# Patient Record
Sex: MALE | Race: WHITE | NOT HISPANIC OR LATINO | Employment: OTHER | URBAN - METROPOLITAN AREA
[De-identification: names, ages, dates, MRNs, and addresses within clinical notes are randomized per-mention and may not be internally consistent; named-entity substitution may affect disease eponyms.]

---

## 2023-03-07 DIAGNOSIS — M54.9 OTHER CHRONIC BACK PAIN: ICD-10-CM

## 2023-03-07 DIAGNOSIS — G89.29 OTHER CHRONIC BACK PAIN: ICD-10-CM

## 2023-03-08 RX ORDER — HYDROCODONE BITARTRATE AND ACETAMINOPHEN 5; 325 MG/1; MG/1
1 TABLET ORAL EVERY 6 HOURS PRN
Qty: 90 TABLET | Status: CANCELLED | OUTPATIENT
Start: 2023-03-08

## 2023-03-08 RX ORDER — ASPIRIN 325 MG
325 TABLET ORAL DAILY
COMMUNITY
Start: 2007-08-27 | End: 2023-08-10 | Stop reason: ALTCHOICE

## 2023-03-08 RX ORDER — NALOXONE HYDROCHLORIDE 4 MG/.1ML
SPRAY NASAL
COMMUNITY
Start: 2021-11-04

## 2023-03-08 RX ORDER — LEVOTHYROXINE SODIUM 112 UG/1
1 TABLET ORAL DAILY
COMMUNITY
End: 2023-03-15 | Stop reason: SDUPTHER

## 2023-03-08 RX ORDER — HYDROCODONE BITARTRATE AND ACETAMINOPHEN 5; 325 MG/1; MG/1
1 TABLET ORAL EVERY 6 HOURS PRN
COMMUNITY
Start: 2022-05-10 | End: 2023-08-10 | Stop reason: ALTCHOICE

## 2023-03-08 RX ORDER — TAMSULOSIN HYDROCHLORIDE 0.4 MG/1
0.4 CAPSULE ORAL DAILY
COMMUNITY

## 2023-03-08 RX ORDER — OXYCODONE AND ACETAMINOPHEN 5; 325 MG/1; MG/1
1 TABLET ORAL DAILY
COMMUNITY
End: 2023-03-08 | Stop reason: SDUPTHER

## 2023-03-08 RX ORDER — VARDENAFIL HYDROCHLORIDE 20 MG/1
20 TABLET ORAL
COMMUNITY
Start: 2006-12-11

## 2023-03-08 RX ORDER — OXYCODONE AND ACETAMINOPHEN 5; 325 MG/1; MG/1
1 TABLET ORAL DAILY
Qty: 30 TABLET | Refills: 0 | Status: SHIPPED | OUTPATIENT
Start: 2023-03-08 | End: 2023-05-12 | Stop reason: SDUPTHER

## 2023-03-08 RX ORDER — PRAVASTATIN SODIUM 40 MG/1
40 TABLET ORAL NIGHTLY
COMMUNITY
Start: 2009-02-03 | End: 2023-08-22 | Stop reason: SDUPTHER

## 2023-03-08 NOTE — TELEPHONE ENCOUNTER
Rx Refill Request Telephone Encounter    Name:  Dale Mcmahan  :  [id number]  Medication Name:  oxycodone  5-325    1 daily      Specific Pharmacy location:  [facility]  Date of last appointment:  2023

## 2023-03-15 ENCOUNTER — TELEPHONE (OUTPATIENT)
Dept: PRIMARY CARE | Facility: CLINIC | Age: 88
End: 2023-03-15
Payer: MEDICARE

## 2023-03-15 DIAGNOSIS — E03.9 HYPOTHYROIDISM, UNSPECIFIED TYPE: ICD-10-CM

## 2023-03-15 RX ORDER — LEVOTHYROXINE SODIUM 112 UG/1
112 TABLET ORAL DAILY
Qty: 90 TABLET | Refills: 3 | Status: SHIPPED | OUTPATIENT
Start: 2023-03-15

## 2023-05-08 PROBLEM — M54.9 CHRONIC BACK PAIN GREATER THAN 3 MONTHS DURATION: Status: ACTIVE | Noted: 2023-05-08

## 2023-05-08 PROBLEM — R91.8 LUNG NODULES: Status: ACTIVE | Noted: 2023-05-08

## 2023-05-08 PROBLEM — R91.8 ABNORMAL CHEST X-RAY WITH MULTIPLE LUNG NODULES: Status: ACTIVE | Noted: 2023-05-08

## 2023-05-08 PROBLEM — E78.5 DYSLIPIDEMIA, GOAL LDL BELOW 100: Status: ACTIVE | Noted: 2023-05-08

## 2023-05-08 PROBLEM — R97.20 ELEVATED PSA: Status: ACTIVE | Noted: 2023-05-08

## 2023-05-08 PROBLEM — R33.8 URINARY RETENTION DUE TO BENIGN PROSTATIC HYPERPLASIA: Status: ACTIVE | Noted: 2023-05-08

## 2023-05-08 PROBLEM — G89.29 CHRONIC BACK PAIN GREATER THAN 3 MONTHS DURATION: Status: ACTIVE | Noted: 2023-05-08

## 2023-05-08 PROBLEM — N40.0 BPH (BENIGN PROSTATIC HYPERPLASIA): Status: ACTIVE | Noted: 2023-05-08

## 2023-05-08 PROBLEM — H25.012 CORTICAL AGE-RELATED CATARACT OF LEFT EYE: Status: ACTIVE | Noted: 2023-05-08

## 2023-05-08 PROBLEM — E03.9 ACQUIRED HYPOTHYROIDISM: Status: ACTIVE | Noted: 2023-05-08

## 2023-05-08 PROBLEM — N40.1 URINARY RETENTION DUE TO BENIGN PROSTATIC HYPERPLASIA: Status: ACTIVE | Noted: 2023-05-08

## 2023-05-10 ENCOUNTER — OFFICE VISIT (OUTPATIENT)
Dept: PRIMARY CARE | Facility: CLINIC | Age: 88
End: 2023-05-10
Payer: MEDICARE

## 2023-05-10 VITALS
WEIGHT: 157.8 LBS | SYSTOLIC BLOOD PRESSURE: 124 MMHG | DIASTOLIC BLOOD PRESSURE: 74 MMHG | HEIGHT: 70 IN | HEART RATE: 76 BPM | BODY MASS INDEX: 22.59 KG/M2 | OXYGEN SATURATION: 95 %

## 2023-05-10 DIAGNOSIS — R73.02 IGT (IMPAIRED GLUCOSE TOLERANCE): ICD-10-CM

## 2023-05-10 DIAGNOSIS — E78.5 DYSLIPIDEMIA, GOAL LDL BELOW 100: ICD-10-CM

## 2023-05-10 DIAGNOSIS — G89.29 CHRONIC BACK PAIN GREATER THAN 3 MONTHS DURATION: ICD-10-CM

## 2023-05-10 DIAGNOSIS — E03.9 ACQUIRED HYPOTHYROIDISM: ICD-10-CM

## 2023-05-10 DIAGNOSIS — Z00.00 ROUTINE GENERAL MEDICAL EXAMINATION AT HEALTH CARE FACILITY: ICD-10-CM

## 2023-05-10 DIAGNOSIS — M54.9 CHRONIC BACK PAIN GREATER THAN 3 MONTHS DURATION: ICD-10-CM

## 2023-05-10 DIAGNOSIS — K21.9 GASTROESOPHAGEAL REFLUX DISEASE WITHOUT ESOPHAGITIS: ICD-10-CM

## 2023-05-10 DIAGNOSIS — Z00.00 MEDICARE ANNUAL WELLNESS VISIT, SUBSEQUENT: Primary | ICD-10-CM

## 2023-05-10 DIAGNOSIS — R97.20 ELEVATED PSA: ICD-10-CM

## 2023-05-10 DIAGNOSIS — N40.1 URINARY RETENTION DUE TO BENIGN PROSTATIC HYPERPLASIA: ICD-10-CM

## 2023-05-10 DIAGNOSIS — R33.8 URINARY RETENTION DUE TO BENIGN PROSTATIC HYPERPLASIA: ICD-10-CM

## 2023-05-10 PROBLEM — N40.0 BPH (BENIGN PROSTATIC HYPERPLASIA): Status: RESOLVED | Noted: 2023-05-08 | Resolved: 2023-05-10

## 2023-05-10 PROCEDURE — 1160F RVW MEDS BY RX/DR IN RCRD: CPT | Performed by: INTERNAL MEDICINE

## 2023-05-10 PROCEDURE — 99214 OFFICE O/P EST MOD 30 MIN: CPT | Performed by: INTERNAL MEDICINE

## 2023-05-10 PROCEDURE — 99397 PER PM REEVAL EST PAT 65+ YR: CPT | Performed by: INTERNAL MEDICINE

## 2023-05-10 PROCEDURE — G0444 DEPRESSION SCREEN ANNUAL: HCPCS | Performed by: INTERNAL MEDICINE

## 2023-05-10 PROCEDURE — G0442 ANNUAL ALCOHOL SCREEN 15 MIN: HCPCS | Performed by: INTERNAL MEDICINE

## 2023-05-10 PROCEDURE — 99497 ADVNCD CARE PLAN 30 MIN: CPT | Performed by: INTERNAL MEDICINE

## 2023-05-10 PROCEDURE — 1159F MED LIST DOCD IN RCRD: CPT | Performed by: INTERNAL MEDICINE

## 2023-05-10 PROCEDURE — G0439 PPPS, SUBSEQ VISIT: HCPCS | Performed by: INTERNAL MEDICINE

## 2023-05-10 PROCEDURE — 1036F TOBACCO NON-USER: CPT | Performed by: INTERNAL MEDICINE

## 2023-05-10 PROCEDURE — 1170F FXNL STATUS ASSESSED: CPT | Performed by: INTERNAL MEDICINE

## 2023-05-10 ASSESSMENT — ACTIVITIES OF DAILY LIVING (ADL)
DRESSING: INDEPENDENT
TAKING_MEDICATION: INDEPENDENT
MANAGING_FINANCES: INDEPENDENT
DOING_HOUSEWORK: INDEPENDENT
BATHING: INDEPENDENT
GROCERY_SHOPPING: INDEPENDENT

## 2023-05-10 ASSESSMENT — PATIENT HEALTH QUESTIONNAIRE - PHQ9
1. LITTLE INTEREST OR PLEASURE IN DOING THINGS: NOT AT ALL
SUM OF ALL RESPONSES TO PHQ9 QUESTIONS 1 AND 2: 0
2. FEELING DOWN, DEPRESSED OR HOPELESS: NOT AT ALL

## 2023-05-10 ASSESSMENT — ENCOUNTER SYMPTOMS
LOSS OF SENSATION IN FEET: 0
OCCASIONAL FEELINGS OF UNSTEADINESS: 0
DEPRESSION: 0

## 2023-05-10 ASSESSMENT — ANXIETY QUESTIONNAIRES
IF YOU CHECKED OFF ANY PROBLEMS ON THIS QUESTIONNAIRE, HOW DIFFICULT HAVE THESE PROBLEMS MADE IT FOR YOU TO DO YOUR WORK, TAKE CARE OF THINGS AT HOME, OR GET ALONG WITH OTHER PEOPLE: NOT DIFFICULT AT ALL
3. WORRYING TOO MUCH ABOUT DIFFERENT THINGS: NOT AT ALL
1. FEELING NERVOUS, ANXIOUS, OR ON EDGE: NOT AT ALL
2. NOT BEING ABLE TO STOP OR CONTROL WORRYING: NOT AT ALL
6. BECOMING EASILY ANNOYED OR IRRITABLE: NOT AT ALL
4. TROUBLE RELAXING: NOT AT ALL
GAD7 TOTAL SCORE: 0
7. FEELING AFRAID AS IF SOMETHING AWFUL MIGHT HAPPEN: NOT AT ALL
5. BEING SO RESTLESS THAT IT IS HARD TO SIT STILL: NOT AT ALL

## 2023-05-10 NOTE — PROGRESS NOTES
Alcohol consumption becomes hazardous when consuming women oh over 65 years old greater than 7 drinks per week or greater than 3 drinks per occasion for men greater than 14 drinks per week or greater than 4 drinks per occasion.  I spent 15 minutes screening for alcohol use.Depression and anxiety screening completed   PHQ9 score   GAD7 score   I spent 15 minutes obtaining and discussing depression screening using PHQ 2 questions with results documented in chart.  Screening using PHQ-9 and YAYO-7 scores were used for follow-up with treatment and referral plan discussed.      I spent greater than 15 minutes discussing advance care planning including the explanation and discussion of advanced directives.  If patient does not have current up-to-date documents examples and information provided on how to create both living will and power of .  toolkit was given to patient and was encouraged to work out completing these documents.OARRS:  Jackson Zhang DO on 5/10/2023 12:07 PM  I have personally reviewed the OARRS report for Dale Barrettp. I have considered the risks of abuse, dependence, addiction and diversion    Is the patient prescribed a combination of a benzodiazepine and opioid?  No    Last Urine Drug Screen / ordered today: Yes  No results found for this or any previous visit (from the past 97723 hour(s)).  Results are as expected.     Controlled Substance Agreement:  Date of the Last Agreement: 5/10/23  Reviewed Controlled Substance Agreement including but not limited to the benefits, risks, and alternatives to treatment with a Controlled Substance medication(s).    Opioids:  What is the patient's goal of therapy? y  Is this being achieved with current treatment? y    I have calculated the patient's Morphine Dose Equivalent (MED):   I have considered referral to Pain Management and/or a specialist, and do not feel it is necessary at this time.    I feel that it is clinically indicated to continue  "this current medication regimen after consideration of alternative therapies, and other non-opioid treatment.    Opioid Risk Screening:  No data recorded    Pain Assessment:  No data recordedSubjective   Reason for Visit: Dale Mcmahan is an 87 y.o. male here for a Medicare Wellness visit.     Past Medical, Surgical, and Family History reviewed and updated in chart.    Reviewed all medications by prescribing practitioner or clinical pharmacist (such as prescriptions, OTCs, herbal therapies and supplements) and documented in the medical record.    HPI    Patient Care Team:  Jackson Zhang DO as PCP - General  Jackson Zhang DO as PCP - [employer] PCP     Review of Systems   All other systems reviewed and are negative.      Objective   Vitals:  /74   Pulse 76   Ht 1.778 m (5' 10\")   Wt 71.6 kg (157 lb 12.8 oz)   SpO2 95%   BMI 22.64 kg/m²       Physical Exam  Vitals and nursing note reviewed.   Constitutional:       General: He is not in acute distress.     Appearance: Normal appearance. He is well-developed. He is not toxic-appearing.   HENT:      Head: Normocephalic and atraumatic.      Right Ear: Tympanic membrane and external ear normal.      Left Ear: Tympanic membrane and external ear normal.      Nose: Nose normal.      Mouth/Throat:      Mouth: Mucous membranes are moist.      Pharynx: Oropharynx is clear. No oropharyngeal exudate or posterior oropharyngeal erythema.      Tonsils: No tonsillar exudate. 2+ on the right. 2+ on the left.   Eyes:      Extraocular Movements: Extraocular movements intact.      Conjunctiva/sclera: Conjunctivae normal.   Cardiovascular:      Rate and Rhythm: Normal rate and regular rhythm.      Pulses: Normal pulses.      Heart sounds: Normal heart sounds. No murmur heard.  Pulmonary:      Effort: Pulmonary effort is normal.      Breath sounds: Normal breath sounds.   Abdominal:      General: Abdomen is flat. Bowel sounds are normal.      " Palpations: Abdomen is soft.   Musculoskeletal:      Cervical back: Neck supple.   Feet:      Right foot:      Skin integrity: Skin integrity normal. No ulcer, blister, skin breakdown, erythema, warmth or callus.      Toenail Condition: Right toenails are normal.      Left foot:      Skin integrity: Skin integrity normal. No ulcer, blister, skin breakdown, erythema, warmth or callus.      Toenail Condition: Left toenails are normal.   Lymphadenopathy:      Cervical: No cervical adenopathy.   Skin:     General: Skin is warm and dry.      Capillary Refill: Capillary refill takes more than 3 seconds.      Findings: No rash.   Neurological:      Mental Status: He is alert. Mental status is at baseline.      Sensory: Sensation is intact.   Psychiatric:         Mood and Affect: Mood normal.         Behavior: Behavior normal.         Thought Content: Thought content normal.         Judgment: Judgment normal.         Assessment/Plan   Problem List Items Addressed This Visit          Digestive    Gastroesophageal reflux disease       Genitourinary    Elevated PSA    Urinary retention due to benign prostatic hyperplasia       Endocrine/Metabolic    Acquired hypothyroidism    Relevant Orders    Comprehensive Metabolic Panel    Hemoglobin A1C    Lipid Panel    Albumin , Urine Random    Tsh With Reflex To Free T4 If Abnormal       Other    Chronic back pain greater than 3 months duration    Dyslipidemia, goal LDL below 100    Relevant Orders    Comprehensive Metabolic Panel    Hemoglobin A1C    Lipid Panel    Albumin , Urine Random    Tsh With Reflex To Free T4 If Abnormal    Medicare annual wellness visit, subsequent - Primary    Relevant Medications    zoster vaccine-recombinant adjuvanted (Shingrix) 50 mcg/0.5 mL vaccine    Other Relevant Orders    Follow Up In [facility] - PCP     Other Visit Diagnoses       IGT (impaired glucose tolerance)        Relevant Orders    Hemoglobin A1C    Routine general medical  examination at health care facility

## 2023-05-12 DIAGNOSIS — G89.29 OTHER CHRONIC BACK PAIN: ICD-10-CM

## 2023-05-12 DIAGNOSIS — M54.9 OTHER CHRONIC BACK PAIN: ICD-10-CM

## 2023-05-12 RX ORDER — OXYCODONE AND ACETAMINOPHEN 5; 325 MG/1; MG/1
1 TABLET ORAL DAILY
Qty: 30 TABLET | Refills: 0 | Status: SHIPPED | OUTPATIENT
Start: 2023-05-12 | End: 2023-06-12 | Stop reason: SDUPTHER

## 2023-06-12 ENCOUNTER — TELEPHONE (OUTPATIENT)
Dept: PRIMARY CARE | Facility: CLINIC | Age: 88
End: 2023-06-12
Payer: MEDICARE

## 2023-06-12 DIAGNOSIS — G89.29 OTHER CHRONIC BACK PAIN: ICD-10-CM

## 2023-06-12 DIAGNOSIS — M54.9 OTHER CHRONIC BACK PAIN: ICD-10-CM

## 2023-06-12 RX ORDER — OXYCODONE AND ACETAMINOPHEN 5; 325 MG/1; MG/1
1 TABLET ORAL DAILY
Qty: 30 TABLET | Refills: 0 | Status: SHIPPED | OUTPATIENT
Start: 2023-06-12 | End: 2023-07-13 | Stop reason: SDUPTHER

## 2023-06-12 NOTE — TELEPHONE ENCOUNTER
Rx Refill Request Telephone Encounter    Name:  Dale Mcmahan  :  [id number]  Medication Name:  Percocet  5-325        Take 1 tablet by mouth once daily  Specific Pharmacy location:  [facility]  Date of last appointment:  5/10/2023  Date of next appointment:  8/10/2023

## 2023-07-13 DIAGNOSIS — M54.9 OTHER CHRONIC BACK PAIN: ICD-10-CM

## 2023-07-13 DIAGNOSIS — G89.29 OTHER CHRONIC BACK PAIN: ICD-10-CM

## 2023-07-13 RX ORDER — OXYCODONE AND ACETAMINOPHEN 5; 325 MG/1; MG/1
1 TABLET ORAL DAILY
Qty: 30 TABLET | Refills: 0 | Status: SHIPPED | OUTPATIENT
Start: 2023-07-13 | End: 2023-08-16 | Stop reason: SDUPTHER

## 2023-08-08 ENCOUNTER — LAB (OUTPATIENT)
Dept: LAB | Facility: LAB | Age: 88
End: 2023-08-08
Payer: MEDICARE

## 2023-08-08 DIAGNOSIS — E03.9 ACQUIRED HYPOTHYROIDISM: ICD-10-CM

## 2023-08-08 DIAGNOSIS — R73.02 IGT (IMPAIRED GLUCOSE TOLERANCE): ICD-10-CM

## 2023-08-08 DIAGNOSIS — E78.5 DYSLIPIDEMIA, GOAL LDL BELOW 100: ICD-10-CM

## 2023-08-08 LAB
ALANINE AMINOTRANSFERASE (SGPT) (U/L) IN SER/PLAS: 16 U/L (ref 10–52)
ALBUMIN (G/DL) IN SER/PLAS: 4.1 G/DL (ref 3.4–5)
ALBUMIN (MG/L) IN URINE: 7.5 MG/L
ALBUMIN/CREATININE (UG/MG) IN URINE: 13.3 UG/MG CRT (ref 0–30)
ALKALINE PHOSPHATASE (U/L) IN SER/PLAS: 79 U/L (ref 33–136)
ANION GAP IN SER/PLAS: 13 MMOL/L (ref 10–20)
ASPARTATE AMINOTRANSFERASE (SGOT) (U/L) IN SER/PLAS: 18 U/L (ref 9–39)
BILIRUBIN TOTAL (MG/DL) IN SER/PLAS: 0.5 MG/DL (ref 0–1.2)
CALCIUM (MG/DL) IN SER/PLAS: 9.1 MG/DL (ref 8.6–10.3)
CARBON DIOXIDE, TOTAL (MMOL/L) IN SER/PLAS: 29 MMOL/L (ref 21–32)
CHLORIDE (MMOL/L) IN SER/PLAS: 102 MMOL/L (ref 98–107)
CHOLESTEROL (MG/DL) IN SER/PLAS: 154 MG/DL (ref 0–199)
CHOLESTEROL IN HDL (MG/DL) IN SER/PLAS: 31.8 MG/DL
CHOLESTEROL/HDL RATIO: 4.8
CREATININE (MG/DL) IN SER/PLAS: 1.1 MG/DL (ref 0.5–1.3)
CREATININE (MG/DL) IN URINE: 56.4 MG/DL (ref 20–370)
ESTIMATED AVERAGE GLUCOSE FOR HBA1C: 148 MG/DL
GFR MALE: 65 ML/MIN/1.73M2
GLUCOSE (MG/DL) IN SER/PLAS: 126 MG/DL (ref 74–99)
HEMOGLOBIN A1C/HEMOGLOBIN TOTAL IN BLOOD: 6.8 %
LDL: 62 MG/DL (ref 0–99)
NON HDL CHOLESTEROL: 122 MG/DL
POTASSIUM (MMOL/L) IN SER/PLAS: 4.1 MMOL/L (ref 3.5–5.3)
PROTEIN TOTAL: 7.6 G/DL (ref 6.4–8.2)
SODIUM (MMOL/L) IN SER/PLAS: 140 MMOL/L (ref 136–145)
THYROTROPIN (MIU/L) IN SER/PLAS BY DETECTION LIMIT <= 0.05 MIU/L: 2.18 MIU/L (ref 0.44–3.98)
TRIGLYCERIDE (MG/DL) IN SER/PLAS: 299 MG/DL (ref 0–149)
UREA NITROGEN (MG/DL) IN SER/PLAS: 10 MG/DL (ref 6–23)
VLDL: 60 MG/DL (ref 0–40)

## 2023-08-08 PROCEDURE — 84443 ASSAY THYROID STIM HORMONE: CPT

## 2023-08-08 PROCEDURE — 80053 COMPREHEN METABOLIC PANEL: CPT

## 2023-08-08 PROCEDURE — 82570 ASSAY OF URINE CREATININE: CPT

## 2023-08-08 PROCEDURE — 36415 COLL VENOUS BLD VENIPUNCTURE: CPT

## 2023-08-08 PROCEDURE — 80061 LIPID PANEL: CPT

## 2023-08-08 PROCEDURE — 83036 HEMOGLOBIN GLYCOSYLATED A1C: CPT

## 2023-08-08 PROCEDURE — 82043 UR ALBUMIN QUANTITATIVE: CPT

## 2023-08-10 ENCOUNTER — OFFICE VISIT (OUTPATIENT)
Dept: PRIMARY CARE | Facility: CLINIC | Age: 88
End: 2023-08-10
Payer: MEDICARE

## 2023-08-10 VITALS
HEART RATE: 68 BPM | DIASTOLIC BLOOD PRESSURE: 70 MMHG | HEIGHT: 70 IN | SYSTOLIC BLOOD PRESSURE: 112 MMHG | BODY MASS INDEX: 22.48 KG/M2 | WEIGHT: 157 LBS

## 2023-08-10 DIAGNOSIS — E11.9 DIABETES MELLITUS WITHOUT COMPLICATION (MULTI): Primary | ICD-10-CM

## 2023-08-10 DIAGNOSIS — E78.5 DYSLIPIDEMIA, GOAL LDL BELOW 100: ICD-10-CM

## 2023-08-10 DIAGNOSIS — E03.9 ACQUIRED HYPOTHYROIDISM: ICD-10-CM

## 2023-08-10 DIAGNOSIS — E11.9 DIABETES MELLITUS TYPE 2, DIET-CONTROLLED (MULTI): ICD-10-CM

## 2023-08-10 DIAGNOSIS — M54.9 CHRONIC BACK PAIN GREATER THAN 3 MONTHS DURATION: ICD-10-CM

## 2023-08-10 DIAGNOSIS — G89.29 CHRONIC BACK PAIN GREATER THAN 3 MONTHS DURATION: ICD-10-CM

## 2023-08-10 DIAGNOSIS — Z00.00 MEDICARE ANNUAL WELLNESS VISIT, SUBSEQUENT: ICD-10-CM

## 2023-08-10 PROBLEM — H61.21 HEARING LOSS OF RIGHT EAR DUE TO CERUMEN IMPACTION: Status: ACTIVE | Noted: 2023-08-10

## 2023-08-10 PROBLEM — H61.22 HEARING LOSS OF LEFT EAR DUE TO CERUMEN IMPACTION: Status: RESOLVED | Noted: 2023-08-10 | Resolved: 2023-08-10

## 2023-08-10 PROBLEM — R91.8 ABNORMAL CHEST X-RAY WITH MULTIPLE LUNG NODULES: Status: RESOLVED | Noted: 2023-05-08 | Resolved: 2023-08-10

## 2023-08-10 PROBLEM — H81.11 BENIGN POSITIONAL VERTIGO, RIGHT: Status: ACTIVE | Noted: 2023-08-10

## 2023-08-10 PROBLEM — H61.22 HEARING LOSS OF LEFT EAR DUE TO CERUMEN IMPACTION: Status: ACTIVE | Noted: 2023-08-10

## 2023-08-10 PROCEDURE — 1036F TOBACCO NON-USER: CPT | Performed by: INTERNAL MEDICINE

## 2023-08-10 PROCEDURE — 69210 REMOVE IMPACTED EAR WAX UNI: CPT | Performed by: INTERNAL MEDICINE

## 2023-08-10 PROCEDURE — 1160F RVW MEDS BY RX/DR IN RCRD: CPT | Performed by: INTERNAL MEDICINE

## 2023-08-10 PROCEDURE — 99213 OFFICE O/P EST LOW 20 MIN: CPT | Performed by: INTERNAL MEDICINE

## 2023-08-10 PROCEDURE — 1159F MED LIST DOCD IN RCRD: CPT | Performed by: INTERNAL MEDICINE

## 2023-08-10 NOTE — PROGRESS NOTES
"Subjective   Patient ID: Dale Mcmahan is a 88 y.o. male who presents for Follow-up.    HPI     Review of Systems    Objective   /70 (BP Location: Right arm, Patient Position: Sitting)   Pulse 68   Ht 1.778 m (5' 10\")   Wt 71.2 kg (157 lb)   BMI 22.53 kg/m²     Physical Exam    Assessment/Plan          "

## 2023-08-10 NOTE — ASSESSMENT & PLAN NOTE
Hemoglobin A1c 6.8 with nonfasting sugar of 126 continue with diet control trial of metformin with side effects patient did not tolerate given patient's age okay for diet control reduce consumption of sweets and processed foods

## 2023-08-10 NOTE — PROGRESS NOTES
Subjective   Reason for Visit: Dale Mcmahan is an 88 y.o. male here for a Medicare Wellness visit.     Past Medical, Surgical, and Family History reviewed and updated in chart.    Reviewed all medications by prescribing practitioner or clinical pharmacist (such as prescriptions, OTCs, herbal therapies and supplements) and documented in the medical record.    HPI    Patient Care Team:  Jackson Zhang DO as PCP - General  Jackson Zhang DO as PCP - [employer] PCP     Review of Systems   HENT:  Positive for hearing loss.    OARRS:  Jackson Zhang DO on 8/10/2023 12:34 PM  I have personally reviewed the OARRS report for Dale Mcmahan. I have considered the risks of abuse, dependence, addiction and diversion    Is the patient prescribed a combination of a benzodiazepine and opioid?  No    Last Urine Drug Screen / ordered today: Yes  No results found for this or any previous visit (from the past 88411 hour(s)).  Results are as expected.     Controlled Substance Agreement:  Date of the Last Agreement: 5/10/23  Reviewed Controlled Substance Agreement including but not limited to the benefits, risks, and alternatives to treatment with a Controlled Substance medication(s).    Opioids:  What is the patient's goal of therapy? y  Is this being achieved with current treatment? y    I have calculated the patient's Morphine Dose Equivalent (MED):   I have considered referral to Pain Management and/or a specialist, and do not feel it is necessary at this time.    I feel that it is clinically indicated to continue this current medication regimen after consideration of alternative therapies, and other non-opioid treatment.    Opioid Risk Screening:  No data recorded    Pain Assessment:  Analgesia  What was your pain level on average during the past week?: 8  What was your pain level at its worst during the past week?: 10 - Pain as bad as it can be  What percentage of your pain has been relieved during  "the past week?: 50 %  Is the amount of pain relief you are now obtaining from your current pain relievers enough to make a real difference in your life?: Y  Query to Clinician: Is the patient's pain relief clinically significant?: Unsure    Activities of Daily Living  Physical Functioning: Same  Family Relationships: Same  Social Relationships: Same  Mood: Same  Sleep Patterns: Same  Overall Functioning: Same    Adverse Events  Is patient experiencing any side effects from current pain relievers?: N  Patient's Overall Severity of Side Effects: None        Objective   Vitals:  /70 (BP Location: Right arm, Patient Position: Sitting)   Pulse 68   Ht 1.778 m (5' 10\")   Wt 71.2 kg (157 lb)   BMI 22.53 kg/m²       Physical Exam  Vitals and nursing note reviewed.   Constitutional:       General: He is not in acute distress.     Appearance: Normal appearance. He is well-developed. He is not toxic-appearing.   HENT:      Head: Normocephalic and atraumatic.      Right Ear: Tympanic membrane and external ear normal. There is impacted cerumen.      Left Ear: Tympanic membrane and external ear normal.      Nose: Nose normal.      Mouth/Throat:      Mouth: Mucous membranes are moist.      Pharynx: Oropharynx is clear. No oropharyngeal exudate or posterior oropharyngeal erythema.      Tonsils: No tonsillar exudate. 2+ on the right. 2+ on the left.   Eyes:      Extraocular Movements: Extraocular movements intact.      Conjunctiva/sclera: Conjunctivae normal.   Cardiovascular:      Rate and Rhythm: Normal rate and regular rhythm.      Pulses: Normal pulses.      Heart sounds: Normal heart sounds. No murmur heard.  Pulmonary:      Effort: Pulmonary effort is normal.      Breath sounds: Normal breath sounds.   Abdominal:      General: Abdomen is flat. Bowel sounds are normal.      Palpations: Abdomen is soft.   Musculoskeletal:      Cervical back: Neck supple.   Feet:      Right foot:      Skin integrity: Skin integrity " normal. No ulcer, blister, skin breakdown, erythema, warmth or callus.      Toenail Condition: Right toenails are normal.      Left foot:      Skin integrity: Skin integrity normal. No ulcer, blister, skin breakdown, erythema, warmth or callus.      Toenail Condition: Left toenails are normal.   Lymphadenopathy:      Cervical: No cervical adenopathy.   Skin:     General: Skin is warm and dry.      Capillary Refill: Capillary refill takes more than 3 seconds.      Findings: No rash.   Neurological:      Mental Status: He is alert. Mental status is at baseline.      Sensory: Sensation is intact.   Psychiatric:         Mood and Affect: Mood normal.         Behavior: Behavior normal.         Thought Content: Thought content normal.         Judgment: Judgment normal.         Assessment/Plan   Problem List Items Addressed This Visit       Acquired hypothyroidism    Current Assessment & Plan     Clinically euthyroid continue levothyroxine 112 mcg daily         Chronic back pain greater than 3 months duration    Current Assessment & Plan     Refill prescription for oxycodone acetaminophen 5/325 1 tablet nightly         Dyslipidemia, goal LDL below 100    RESOLVED: Medicare annual wellness visit, subsequent    Relevant Orders    Follow Up In [facility] - PCP - Established    Diabetes mellitus type 2, diet-controlled (CMS/[facility]) - Primary    Current Assessment & Plan     Hemoglobin A1c 6.8 with nonfasting sugar of 126 continue with diet control trial of metformin with side effects patient did not tolerate given patient's age okay for diet control reduce consumption of sweets and processed foods

## 2023-08-16 ENCOUNTER — TELEPHONE (OUTPATIENT)
Dept: PRIMARY CARE | Facility: CLINIC | Age: 88
End: 2023-08-16
Payer: MEDICARE

## 2023-08-16 DIAGNOSIS — G89.29 OTHER CHRONIC BACK PAIN: ICD-10-CM

## 2023-08-16 DIAGNOSIS — M54.9 OTHER CHRONIC BACK PAIN: ICD-10-CM

## 2023-08-16 RX ORDER — OXYCODONE AND ACETAMINOPHEN 5; 325 MG/1; MG/1
1 TABLET ORAL DAILY
Qty: 30 TABLET | Refills: 0 | Status: SHIPPED | OUTPATIENT
Start: 2023-08-16 | End: 2023-09-15 | Stop reason: SDUPTHER

## 2023-08-16 NOTE — TELEPHONE ENCOUNTER
Rx Refill Request Telephone Encounter    Name:  Dale Mcmahan  :  [id number]  Medication Name:  Percocet  5-325 mg        Take 1 tablet by mouth once daily  Specific Pharmacy location:  [facility]  Date of last appointment:  8/10/2023  Date of next appointment:  2023

## 2023-08-22 DIAGNOSIS — E78.5 DYSLIPIDEMIA, GOAL LDL BELOW 100: ICD-10-CM

## 2023-08-22 RX ORDER — PRAVASTATIN SODIUM 40 MG/1
40 TABLET ORAL NIGHTLY
Qty: 90 TABLET | Refills: 3 | Status: SHIPPED | OUTPATIENT
Start: 2023-08-22 | End: 2024-08-16

## 2023-09-15 DIAGNOSIS — G89.29 OTHER CHRONIC BACK PAIN: ICD-10-CM

## 2023-09-15 DIAGNOSIS — M54.9 OTHER CHRONIC BACK PAIN: ICD-10-CM

## 2023-09-17 RX ORDER — OXYCODONE AND ACETAMINOPHEN 5; 325 MG/1; MG/1
1 TABLET ORAL DAILY
Qty: 30 TABLET | Refills: 0 | Status: SHIPPED | OUTPATIENT
Start: 2023-09-17 | End: 2023-10-16 | Stop reason: SDUPTHER

## 2023-10-16 ENCOUNTER — TELEPHONE (OUTPATIENT)
Dept: PRIMARY CARE | Facility: CLINIC | Age: 88
End: 2023-10-16
Payer: MEDICARE

## 2023-10-16 DIAGNOSIS — G89.29 OTHER CHRONIC BACK PAIN: ICD-10-CM

## 2023-10-16 DIAGNOSIS — M54.9 OTHER CHRONIC BACK PAIN: ICD-10-CM

## 2023-10-16 RX ORDER — OXYCODONE AND ACETAMINOPHEN 5; 325 MG/1; MG/1
1 TABLET ORAL DAILY
Qty: 30 TABLET | Refills: 0 | Status: SHIPPED | OUTPATIENT
Start: 2023-10-16 | End: 2023-11-14 | Stop reason: SDUPTHER

## 2023-10-16 NOTE — TELEPHONE ENCOUNTER
Rx Refill Request Telephone Encounter    Name:  Dale Mcmahan  :  [id number]  Medication Name:  Oxycodone-acetaminophen  5-325 mg        Take 1 tablet by mouth once daily  Specific Pharmacy location:  [facility]  Date of last appointment:  8/10/2023  Date of next appointment:  2023

## 2023-11-09 DIAGNOSIS — Z23 FLU VACCINE NEED: ICD-10-CM

## 2023-11-14 ENCOUNTER — OFFICE VISIT (OUTPATIENT)
Dept: PRIMARY CARE | Facility: CLINIC | Age: 88
End: 2023-11-14
Payer: MEDICARE

## 2023-11-14 VITALS
HEART RATE: 60 BPM | BODY MASS INDEX: 22.33 KG/M2 | HEIGHT: 70 IN | SYSTOLIC BLOOD PRESSURE: 120 MMHG | WEIGHT: 156 LBS | DIASTOLIC BLOOD PRESSURE: 82 MMHG

## 2023-11-14 DIAGNOSIS — E03.9 ACQUIRED HYPOTHYROIDISM: ICD-10-CM

## 2023-11-14 DIAGNOSIS — H61.21 HEARING LOSS OF RIGHT EAR DUE TO CERUMEN IMPACTION: ICD-10-CM

## 2023-11-14 DIAGNOSIS — N40.1 URINARY RETENTION DUE TO BENIGN PROSTATIC HYPERPLASIA: ICD-10-CM

## 2023-11-14 DIAGNOSIS — E11.9 DIABETES MELLITUS WITHOUT COMPLICATION (MULTI): ICD-10-CM

## 2023-11-14 DIAGNOSIS — M54.9 CHRONIC BACK PAIN GREATER THAN 3 MONTHS DURATION: ICD-10-CM

## 2023-11-14 DIAGNOSIS — Z23 FLU VACCINE NEED: ICD-10-CM

## 2023-11-14 DIAGNOSIS — M54.9 OTHER CHRONIC BACK PAIN: ICD-10-CM

## 2023-11-14 DIAGNOSIS — E11.9 DIABETES MELLITUS TYPE 2, DIET-CONTROLLED (MULTI): Primary | ICD-10-CM

## 2023-11-14 DIAGNOSIS — R33.8 URINARY RETENTION DUE TO BENIGN PROSTATIC HYPERPLASIA: ICD-10-CM

## 2023-11-14 DIAGNOSIS — G89.29 OTHER CHRONIC BACK PAIN: ICD-10-CM

## 2023-11-14 DIAGNOSIS — G89.29 CHRONIC BACK PAIN GREATER THAN 3 MONTHS DURATION: ICD-10-CM

## 2023-11-14 DIAGNOSIS — E78.5 DYSLIPIDEMIA, GOAL LDL BELOW 100: ICD-10-CM

## 2023-11-14 PROCEDURE — 1160F RVW MEDS BY RX/DR IN RCRD: CPT | Performed by: INTERNAL MEDICINE

## 2023-11-14 PROCEDURE — 1159F MED LIST DOCD IN RCRD: CPT | Performed by: INTERNAL MEDICINE

## 2023-11-14 PROCEDURE — 99213 OFFICE O/P EST LOW 20 MIN: CPT | Performed by: INTERNAL MEDICINE

## 2023-11-14 PROCEDURE — 1036F TOBACCO NON-USER: CPT | Performed by: INTERNAL MEDICINE

## 2023-11-14 PROCEDURE — 69210 REMOVE IMPACTED EAR WAX UNI: CPT | Performed by: INTERNAL MEDICINE

## 2023-11-14 PROCEDURE — G0008 ADMIN INFLUENZA VIRUS VAC: HCPCS | Performed by: INTERNAL MEDICINE

## 2023-11-14 PROCEDURE — 90662 IIV NO PRSV INCREASED AG IM: CPT | Performed by: INTERNAL MEDICINE

## 2023-11-14 RX ORDER — OXYCODONE AND ACETAMINOPHEN 5; 325 MG/1; MG/1
1 TABLET ORAL DAILY
Qty: 30 TABLET | Refills: 0 | Status: SHIPPED | OUTPATIENT
Start: 2023-11-14 | End: 2023-12-21 | Stop reason: SDUPTHER

## 2023-11-14 NOTE — ASSESSMENT & PLAN NOTE
Reevaluate lipid profile on pravastatin 40 mg daily in the setting of diet-controlled diabetes mellitus

## 2023-11-14 NOTE — PROGRESS NOTES
Subjective   Reason for Visit: Dale Mcmahan is an 88 y.o. male here for a fu OARRS:  Jackson Zhang DO on 11/14/2023 12:42 PM  I have personally reviewed the OARRS report for Dale Mcmahan. I have considered the risks of abuse, dependence, addiction and diversion    Is the patient prescribed a combination of a benzodiazepine and opioid?  No    Last Urine Drug Screen / ordered today: Yes  No results found for this or any previous visit (from the past 8760 hour(s)).  Results are as expected.     Clinical rationale for not completing a Urine Drug Screen: 5/10/23      Controlled Substance Agreement:  Date of the Last Agreement: 5/10/23  Reviewed Controlled Substance Agreement including but not limited to the benefits, risks, and alternatives to treatment with a Controlled Substance medication(s).    Opioids:  What is the patient's goal of therapy? y  Is this being achieved with current treatment? y    I have calculated the patient's Morphine Dose Equivalent (MED):   I have considered referral to Pain Management and/or a specialist, and do not feel it is necessary at this time.    I feel that it is clinically indicated to continue this current medication regimen after consideration of alternative therapies, and other non-opioid treatment.    Opioid Risk Screening:  No data recorded    Pain Assessment:  Analgesia  What was your pain level on average during the past week?: 8  What was your pain level at its worst during the past week?: 9  What percentage of your pain has been relieved during the past week?: 75 %  Is the amount of pain relief you are now obtaining from your current pain relievers enough to make a real difference in your life?: Y  Query to Clinician: Is the patient's pain relief clinically significant?: Unsure    Activities of Daily Living  Physical Functioning: Same  Family Relationships: Same  Social Relationships: Same  Mood: Same  Sleep Patterns: Better  Overall Functioning: Better    Adverse  "Events  Is patient experiencing any side effects from current pain relievers?: N  Patient's Overall Severity of Side Effects: None    visit.     Past Medical, Surgical, and Family History reviewed and updated in chart.    Reviewed all medications by prescribing practitioner or clinical pharmacist (such as prescriptions, OTCs, herbal therapies and supplements) and documented in the medical record.    HPI    Patient Care Team:  Jackson Zhang DO as PCP - General  Jackson Zhang DO as PCP - [employer] PCP     Review of Systems   All other systems reviewed and are negative.      Objective   Vitals:  /82 (BP Location: Left arm, Patient Position: Sitting)   Pulse 60   Ht 1.778 m (5' 10\")   Wt 70.8 kg (156 lb)   BMI 22.38 kg/m²       Physical Exam  Vitals and nursing note reviewed.   Constitutional:       General: He is not in acute distress.     Appearance: Normal appearance. He is well-developed. He is not toxic-appearing.   HENT:      Head: Normocephalic and atraumatic.      Right Ear: Tympanic membrane and external ear normal.      Left Ear: Tympanic membrane and external ear normal.      Nose: Nose normal.      Mouth/Throat:      Mouth: Mucous membranes are moist.      Pharynx: Oropharynx is clear. No oropharyngeal exudate or posterior oropharyngeal erythema.      Tonsils: No tonsillar exudate. 2+ on the right. 2+ on the left.   Eyes:      Extraocular Movements: Extraocular movements intact.      Conjunctiva/sclera: Conjunctivae normal.   Cardiovascular:      Rate and Rhythm: Normal rate and regular rhythm.      Pulses: Normal pulses.      Heart sounds: Normal heart sounds. No murmur heard.  Pulmonary:      Effort: Pulmonary effort is normal.      Breath sounds: Normal breath sounds.   Abdominal:      General: Abdomen is flat. Bowel sounds are normal.      Palpations: Abdomen is soft.   Musculoskeletal:      Cervical back: Neck supple.   Feet:      Right foot:      Skin integrity: " Skin integrity normal. No ulcer, blister, skin breakdown, erythema, warmth or callus.      Toenail Condition: Right toenails are normal.      Left foot:      Skin integrity: Skin integrity normal. No ulcer, blister, skin breakdown, erythema, warmth or callus.      Toenail Condition: Left toenails are normal.   Lymphadenopathy:      Cervical: No cervical adenopathy.   Skin:     General: Skin is warm and dry.      Capillary Refill: Capillary refill takes more than 3 seconds.      Findings: No rash.   Neurological:      Mental Status: He is alert. Mental status is at baseline.      Sensory: Sensation is intact.   Psychiatric:         Mood and Affect: Mood normal.         Behavior: Behavior normal.         Thought Content: Thought content normal.         Judgment: Judgment normal.         Assessment/Plan   Problem List Items Addressed This Visit       Acquired hypothyroidism    Current Assessment & Plan     Check thyroid functions with next blood work clinically euthyroid at this time on levothyroxine 112 mcg daily         Relevant Orders    Tsh With Reflex To Free T4 If Abnormal    Chronic back pain greater than 3 months duration    Current Assessment & Plan     Stable with daily use of oxycodone as needed and low-dose anti-inflammatory patient able to function well reevaluate at next visit         Dyslipidemia, goal LDL below 100    Current Assessment & Plan     Reevaluate lipid profile on pravastatin 40 mg daily in the setting of diet-controlled diabetes mellitus         Relevant Orders    Comprehensive Metabolic Panel    Hemoglobin A1C    Lipid Panel    Albumin , Urine Random    Follow Up In [facility] - PCP - Established    Urinary retention due to benign prostatic hyperplasia    Current Assessment & Plan     Stable at this time continue tamsulosin         Diabetes mellitus type 2, diet-controlled (CMS/[facility]) - Primary    Current Assessment & Plan     Last A1c was stable at 6.8 managed medically without  medication continue to monitor closely evaluate reevaluate blood work for evidence of endorgan disease         Relevant Orders    Comprehensive Metabolic Panel    Hemoglobin A1C    Lipid Panel    Albumin , Urine Random    Follow Up In [facility] - PCP - Established    Hearing loss of right ear due to cerumen impaction    Current Assessment & Plan     Irrigation with good results today to avoid recurrence of BPPV         Chronic back pain    Relevant Medications    oxyCODONE-acetaminophen (Percocet) 5-325 mg tablet    Other Relevant Orders    Comprehensive Metabolic Panel    Hemoglobin A1C    Lipid Panel    Albumin , Urine Random    Follow Up In [facility] - PCP - Established    Flu vaccine need    Relevant Orders    Comprehensive Metabolic Panel    Hemoglobin A1C    Lipid Panel    Albumin , Urine Random    Follow Up In [facility] - PCP - Established     Other Visit Diagnoses       Diabetes mellitus without complication (CMS/[facility])

## 2023-11-14 NOTE — ASSESSMENT & PLAN NOTE
Check thyroid functions with next blood work clinically euthyroid at this time on levothyroxine 112 mcg daily

## 2023-11-14 NOTE — ASSESSMENT & PLAN NOTE
Stable with daily use of oxycodone as needed and low-dose anti-inflammatory patient able to function well reevaluate at next visit

## 2023-11-14 NOTE — ASSESSMENT & PLAN NOTE
Last A1c was stable at 6.8 managed medically without medication continue to monitor closely evaluate reevaluate blood work for evidence of endorgan disease

## 2023-11-14 NOTE — PROGRESS NOTES
"Subjective   Patient ID: Dale Mcmahan is a 88 y.o. male who presents for Follow-up.    HPI     Review of Systems    Objective   /82 (BP Location: Left arm, Patient Position: Sitting)   Pulse 60   Ht 1.778 m (5' 10\")   Wt 70.8 kg (156 lb)   BMI 22.38 kg/m²     Physical Exam    Assessment/Plan          "

## 2023-12-21 ENCOUNTER — TELEPHONE (OUTPATIENT)
Dept: PRIMARY CARE | Facility: CLINIC | Age: 88
End: 2023-12-21
Payer: MEDICARE

## 2023-12-21 DIAGNOSIS — G89.29 OTHER CHRONIC BACK PAIN: ICD-10-CM

## 2023-12-21 DIAGNOSIS — M54.9 OTHER CHRONIC BACK PAIN: ICD-10-CM

## 2023-12-21 RX ORDER — OXYCODONE AND ACETAMINOPHEN 5; 325 MG/1; MG/1
1 TABLET ORAL DAILY
Qty: 30 TABLET | Refills: 0 | Status: SHIPPED | OUTPATIENT
Start: 2023-12-21 | End: 2024-01-22 | Stop reason: SDUPTHER

## 2024-01-22 ENCOUNTER — TELEPHONE (OUTPATIENT)
Dept: PRIMARY CARE | Facility: CLINIC | Age: 89
End: 2024-01-22
Payer: MEDICARE

## 2024-01-22 DIAGNOSIS — M54.9 OTHER CHRONIC BACK PAIN: ICD-10-CM

## 2024-01-22 DIAGNOSIS — G89.29 OTHER CHRONIC BACK PAIN: ICD-10-CM

## 2024-01-22 RX ORDER — OXYCODONE AND ACETAMINOPHEN 5; 325 MG/1; MG/1
1 TABLET ORAL DAILY
Qty: 30 TABLET | Refills: 0 | Status: SHIPPED | OUTPATIENT
Start: 2024-01-22 | End: 2024-02-28 | Stop reason: SDUPTHER

## 2024-02-13 DIAGNOSIS — Z51.81 THERAPEUTIC DRUG MONITORING: ICD-10-CM

## 2024-02-14 ENCOUNTER — OFFICE VISIT (OUTPATIENT)
Dept: PRIMARY CARE | Facility: CLINIC | Age: 89
End: 2024-02-14
Payer: MEDICARE

## 2024-02-14 VITALS
HEIGHT: 70 IN | BODY MASS INDEX: 22.76 KG/M2 | WEIGHT: 159 LBS | HEART RATE: 96 BPM | SYSTOLIC BLOOD PRESSURE: 122 MMHG | DIASTOLIC BLOOD PRESSURE: 74 MMHG

## 2024-02-14 DIAGNOSIS — R97.20 ELEVATED PSA: ICD-10-CM

## 2024-02-14 DIAGNOSIS — M54.9 OTHER CHRONIC BACK PAIN: ICD-10-CM

## 2024-02-14 DIAGNOSIS — R33.8 URINARY RETENTION DUE TO BENIGN PROSTATIC HYPERPLASIA: ICD-10-CM

## 2024-02-14 DIAGNOSIS — E03.9 ACQUIRED HYPOTHYROIDISM: ICD-10-CM

## 2024-02-14 DIAGNOSIS — Z00.00 MEDICARE ANNUAL WELLNESS VISIT, SUBSEQUENT: Primary | ICD-10-CM

## 2024-02-14 DIAGNOSIS — Z23 FLU VACCINE NEED: ICD-10-CM

## 2024-02-14 DIAGNOSIS — E78.5 DYSLIPIDEMIA, GOAL LDL BELOW 100: ICD-10-CM

## 2024-02-14 DIAGNOSIS — E78.5 DYSLIPIDEMIA ASSOCIATED WITH TYPE 2 DIABETES MELLITUS (MULTI): ICD-10-CM

## 2024-02-14 DIAGNOSIS — R91.8 LUNG NODULES: ICD-10-CM

## 2024-02-14 DIAGNOSIS — E11.9 DIABETES MELLITUS TYPE 2, DIET-CONTROLLED (MULTI): ICD-10-CM

## 2024-02-14 DIAGNOSIS — G89.29 OTHER CHRONIC BACK PAIN: ICD-10-CM

## 2024-02-14 DIAGNOSIS — E11.69 DYSLIPIDEMIA ASSOCIATED WITH TYPE 2 DIABETES MELLITUS (MULTI): ICD-10-CM

## 2024-02-14 DIAGNOSIS — N40.1 URINARY RETENTION DUE TO BENIGN PROSTATIC HYPERPLASIA: ICD-10-CM

## 2024-02-14 DIAGNOSIS — Z51.81 THERAPEUTIC DRUG MONITORING: ICD-10-CM

## 2024-02-14 PROCEDURE — 99214 OFFICE O/P EST MOD 30 MIN: CPT | Performed by: INTERNAL MEDICINE

## 2024-02-14 PROCEDURE — 1170F FXNL STATUS ASSESSED: CPT | Performed by: INTERNAL MEDICINE

## 2024-02-14 PROCEDURE — 99497 ADVNCD CARE PLAN 30 MIN: CPT | Performed by: INTERNAL MEDICINE

## 2024-02-14 PROCEDURE — G0446 INTENS BEHAVE THER CARDIO DX: HCPCS | Performed by: INTERNAL MEDICINE

## 2024-02-14 PROCEDURE — 1160F RVW MEDS BY RX/DR IN RCRD: CPT | Performed by: INTERNAL MEDICINE

## 2024-02-14 PROCEDURE — 99397 PER PM REEVAL EST PAT 65+ YR: CPT | Performed by: INTERNAL MEDICINE

## 2024-02-14 PROCEDURE — 1159F MED LIST DOCD IN RCRD: CPT | Performed by: INTERNAL MEDICINE

## 2024-02-14 PROCEDURE — G0444 DEPRESSION SCREEN ANNUAL: HCPCS | Performed by: INTERNAL MEDICINE

## 2024-02-14 PROCEDURE — 1036F TOBACCO NON-USER: CPT | Performed by: INTERNAL MEDICINE

## 2024-02-14 ASSESSMENT — ACTIVITIES OF DAILY LIVING (ADL)
BATHING: INDEPENDENT
GROCERY_SHOPPING: INDEPENDENT
DOING_HOUSEWORK: INDEPENDENT
MANAGING_FINANCES: INDEPENDENT
TAKING_MEDICATION: INDEPENDENT
DRESSING: INDEPENDENT

## 2024-02-14 ASSESSMENT — ANXIETY QUESTIONNAIRES
GAD7 TOTAL SCORE: 0
5. BEING SO RESTLESS THAT IT IS HARD TO SIT STILL: NOT AT ALL
7. FEELING AFRAID AS IF SOMETHING AWFUL MIGHT HAPPEN: NOT AT ALL
6. BECOMING EASILY ANNOYED OR IRRITABLE: NOT AT ALL
3. WORRYING TOO MUCH ABOUT DIFFERENT THINGS: NOT AT ALL
2. NOT BEING ABLE TO STOP OR CONTROL WORRYING: NOT AT ALL
IF YOU CHECKED OFF ANY PROBLEMS ON THIS QUESTIONNAIRE, HOW DIFFICULT HAVE THESE PROBLEMS MADE IT FOR YOU TO DO YOUR WORK, TAKE CARE OF THINGS AT HOME, OR GET ALONG WITH OTHER PEOPLE: NOT DIFFICULT AT ALL
1. FEELING NERVOUS, ANXIOUS, OR ON EDGE: NOT AT ALL
4. TROUBLE RELAXING: NOT AT ALL

## 2024-02-14 ASSESSMENT — ENCOUNTER SYMPTOMS
LOSS OF SENSATION IN FEET: 0
OCCASIONAL FEELINGS OF UNSTEADINESS: 0
DEPRESSION: 0

## 2024-02-14 NOTE — ASSESSMENT & PLAN NOTE
NSAID contraindication continue oxycodone 5/325 1 tablet daily updated annual contract today has Narcan prescription low risk abuse potential has been stable for many years unable to taper off provide's good functionality with minimal symptoms denies severity of constipation

## 2024-02-14 NOTE — PROGRESS NOTES
Subjective   Reason for Visit: Dale Mcmahan is an 88 y.o. male here for a Medicare Wellness visit.     Past Medical, Surgical, and Family History reviewed and updated in chart.    Reviewed all medications by prescribing practitioner or clinical pharmacist (such as prescriptions, OTCs, herbal therapies and supplements) and documented in the medical record.    HPI    Patient Care Team:  Jackson Zhang DO as PCP - General  Jackson Zhang DO as PCP - [employer] PCP     Review of Systems   All other systems reviewed and are negative.  Depression and anxiety screening completed   PHQ9 score   GAD7 score   I spent 15 minutes obtaining and discussing depression screening using PHQ 2 questions with results documented in chart.  Screening using PHQ-9 and YAYO-7 scores were used for follow-up with treatment and referral plan discussed.      I spent greater than 15 minutes discussing advance care planning including the explanation and discussion of advanced directives.  If patient does not have current up-to-date documents examples and information provided on how to create both living will and power of . [facility] toolkit was given to patient and was encouraged to work out completing these documents.OARRS:  Jackson Zhang DO on 2/14/2024 12:54 PM  I have personally reviewed the OARRS report for Dale Mcmahan. I have considered the risks of abuse, dependence, addiction and diversion    Is the patient prescribed a combination of a benzodiazepine and opioid?  No    Last Urine Drug Screen / ordered today: Yes  No results found for this or any previous visit (from the past 8760 hour(s)).  Results are as expected.     Clinical rationale for not completing a Urine Drug Screen: 2/14/24      Controlled Substance Agreement:  Date of the Last Agreement: 2/14/24  Reviewed Controlled Substance Agreement including but not limited to the benefits, risks, and alternatives to treatment with a Controlled  "Substance medication(s).    Opioids:  What is the patient's goal of therapy? y  Is this being achieved with current treatment? y    I have calculated the patient's Morphine Dose Equivalent (MED):   I have considered referral to Pain Management and/or a specialist, and do not feel it is necessary at this time.    I feel that it is clinically indicated to continue this current medication regimen after consideration of alternative therapies, and other non-opioid treatment.    Opioid Risk Screening:  No data recorded    Pain Assessment:  Analgesia  What was your pain level on average during the past week?: 5  What was your pain level at its worst during the past week?: 9  What percentage of your pain has been relieved during the past week?: 60 %  Is the amount of pain relief you are now obtaining from your current pain relievers enough to make a real difference in your life?: Y  Query to Clinician: Is the patient's pain relief clinically significant?: Unsure    Activities of Daily Living  Physical Functioning: Same  Family Relationships: Same  Social Relationships: Same  Mood: Same  Sleep Patterns: Same  Overall Functioning: Same    Adverse Events  Is patient experiencing any side effects from current pain relievers?: N  Patient's Overall Severity of Side Effects: None        Objective   Vitals:  /74 (BP Location: Right arm, Patient Position: Sitting)   Pulse 96   Ht 1.778 m (5' 10\")   Wt 72.1 kg (159 lb)   BMI 22.81 kg/m²       Physical Exam  Vitals and nursing note reviewed.   Constitutional:       General: He is not in acute distress.     Appearance: Normal appearance. He is well-developed. He is not toxic-appearing.   HENT:      Head: Normocephalic and atraumatic.      Right Ear: Tympanic membrane and external ear normal.      Left Ear: Tympanic membrane and external ear normal.      Nose: Nose normal.      Mouth/Throat:      Mouth: Mucous membranes are moist.      Pharynx: Oropharynx is clear. No " oropharyngeal exudate or posterior oropharyngeal erythema.      Tonsils: No tonsillar exudate. 2+ on the right. 2+ on the left.   Eyes:      Extraocular Movements: Extraocular movements intact.      Conjunctiva/sclera: Conjunctivae normal.   Cardiovascular:      Rate and Rhythm: Normal rate and regular rhythm.      Pulses: Normal pulses.      Heart sounds: Normal heart sounds. No murmur heard.  Pulmonary:      Effort: Pulmonary effort is normal.      Breath sounds: Normal breath sounds.   Abdominal:      General: Abdomen is flat. Bowel sounds are normal.      Palpations: Abdomen is soft.   Musculoskeletal:      Cervical back: Neck supple.   Feet:      Right foot:      Skin integrity: Skin integrity normal. No ulcer, blister, skin breakdown, erythema, warmth or callus.      Toenail Condition: Right toenails are normal.      Left foot:      Skin integrity: Skin integrity normal. No ulcer, blister, skin breakdown, erythema, warmth or callus.      Toenail Condition: Left toenails are normal.   Lymphadenopathy:      Cervical: No cervical adenopathy.   Skin:     General: Skin is warm and dry.      Capillary Refill: Capillary refill takes more than 3 seconds.      Findings: No rash.   Neurological:      Mental Status: He is alert. Mental status is at baseline.      Sensory: Sensation is intact.   Psychiatric:         Mood and Affect: Mood normal.         Behavior: Behavior normal.         Thought Content: Thought content normal.         Judgment: Judgment normal.         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 (CMS/HCC)    Current Assessment & Plan     Reevaluate lipid profile on pravastatin 40 mg daily         Elevated PSA    Current Assessment & Plan     Follows with urologist history of  prostate biopsy PSA 8.2         Lung nodules    Current Assessment & Plan     Stable no further screening at this time related to age symptoms minimal not requiring bronchodilator treatment no evidence to suggest worsening emphysema or COPD at this time functioning well         Urinary retention due to benign prostatic hyperplasia    Current Assessment & Plan     Stable off Pepe continue tamsulosin 0.4 mg 2 tablets daily         Medicare annual wellness visit, subsequent - Primary    Current Assessment & Plan     Up-to-date with vaccinations and screenings discussed advance medical directives daughter is medical power of  will give advanced medical directives toolkit to review         Relevant Orders    Follow Up In [facility] - PCP - Established    Diabetes mellitus type 2, diet-controlled (CMS/[facility])    Current Assessment & Plan     Reevaluate hemoglobin M0aOgze reported stable at 6.8 diet controlled weight stable diet stable         Relevant Orders    Follow Up In [facility] - PCP - Established    Chronic back pain    Current Assessment & Plan     NSAID contraindication continue oxycodone 5/325 1 tablet daily updated annual contract today has Narcan prescription low risk abuse potential has been stable for many years unable to taper off provide's good functionality with minimal symptoms denies severity of constipation         Relevant Orders    Follow Up In [facility] - PCP - Established    Flu vaccine need    Therapeutic drug monitoring    Relevant Orders    Follow Up In [facility] - PCP - Established           none

## 2024-02-14 NOTE — ASSESSMENT & PLAN NOTE
Stable no further screening at this time related to age symptoms minimal not requiring bronchodilator treatment no evidence to suggest worsening emphysema or COPD at this time functioning well

## 2024-02-14 NOTE — PROGRESS NOTES
"Subjective   Reason for Visit: Dale Mcmahan is an 88 y.o. male here for a Medicare Wellness visit.          Reviewed all medications by prescribing practitioner or clinical pharmacist (such as prescriptions, OTCs, herbal therapies and supplements) and documented in the medical record.    HPI    Patient Care Team:  Jackson Zhang DO as PCP - General  Jackson Zhang DO as PCP - [employer] PCP     Review of Systems    Objective   Vitals:  /74 (BP Location: Right arm, Patient Position: Sitting)   Ht 1.778 m (5' 10\")   Wt 72.1 kg (159 lb)   BMI 22.81 kg/m²       Physical Exam    Assessment/Plan   Problem List Items Addressed This Visit       Dyslipidemia, goal LDL below 100    Diabetes mellitus type 2, diet-controlled (CMS/[facility])    Chronic back pain    Flu vaccine need     Other Visit Diagnoses       Therapeutic drug monitoring                   "

## 2024-02-14 NOTE — ASSESSMENT & PLAN NOTE
Up-to-date with vaccinations and screenings discussed advance medical directives daughter is medical power of  will give advanced medical directives toolkit to review

## 2024-02-21 LAB — SCAN RESULT: NORMAL

## 2024-02-28 ENCOUNTER — TELEPHONE (OUTPATIENT)
Dept: PRIMARY CARE | Facility: CLINIC | Age: 89
End: 2024-02-28
Payer: MEDICARE

## 2024-02-28 DIAGNOSIS — M54.9 OTHER CHRONIC BACK PAIN: ICD-10-CM

## 2024-02-28 DIAGNOSIS — G89.29 OTHER CHRONIC BACK PAIN: ICD-10-CM

## 2024-02-28 RX ORDER — OXYCODONE AND ACETAMINOPHEN 5; 325 MG/1; MG/1
1 TABLET ORAL DAILY
Qty: 30 TABLET | Refills: 0 | Status: SHIPPED | OUTPATIENT
Start: 2024-02-28 | End: 2024-04-03 | Stop reason: SDUPTHER

## 2024-02-28 NOTE — TELEPHONE ENCOUNTER
Daughter called and said patient's oxycodone was not sent in. Asking if you can please send it in to [facility] [place]   LOV - 2/14/24  NOV - 5/13/24

## 2024-03-25 ENCOUNTER — HOSPITAL ENCOUNTER (EMERGENCY)
Facility: HOSPITAL | Age: 89
Discharge: HOME | End: 2024-03-25
Attending: EMERGENCY MEDICINE
Payer: MEDICARE

## 2024-03-25 VITALS
SYSTOLIC BLOOD PRESSURE: 120 MMHG | TEMPERATURE: 97.5 F | OXYGEN SATURATION: 96 % | HEART RATE: 87 BPM | DIASTOLIC BLOOD PRESSURE: 72 MMHG | RESPIRATION RATE: 16 BRPM

## 2024-03-25 DIAGNOSIS — R33.9 URINARY RETENTION: Primary | ICD-10-CM

## 2024-03-25 LAB
APPEARANCE UR: CLEAR
BILIRUB UR STRIP.AUTO-MCNC: NEGATIVE MG/DL
COLOR UR: YELLOW
GLUCOSE UR STRIP.AUTO-MCNC: NEGATIVE MG/DL
HOLD SPECIMEN: NORMAL
KETONES UR STRIP.AUTO-MCNC: NEGATIVE MG/DL
LEUKOCYTE ESTERASE UR QL STRIP.AUTO: NEGATIVE
MUCOUS THREADS #/AREA URNS AUTO: ABNORMAL /LPF
NITRITE UR QL STRIP.AUTO: NEGATIVE
PH UR STRIP.AUTO: 6 [PH]
PROT UR STRIP.AUTO-MCNC: NEGATIVE MG/DL
RBC # UR STRIP.AUTO: ABNORMAL /UL
RBC #/AREA URNS AUTO: ABNORMAL /HPF
SP GR UR STRIP.AUTO: 1.01
UROBILINOGEN UR STRIP.AUTO-MCNC: <2 MG/DL
WBC #/AREA URNS AUTO: ABNORMAL /HPF

## 2024-03-25 PROCEDURE — 81001 URINALYSIS AUTO W/SCOPE: CPT | Performed by: EMERGENCY MEDICINE

## 2024-03-25 PROCEDURE — 2500000005 HC RX 250 GENERAL PHARMACY W/O HCPCS: Performed by: EMERGENCY MEDICINE

## 2024-03-25 PROCEDURE — 99283 EMERGENCY DEPT VISIT LOW MDM: CPT | Mod: 25

## 2024-03-25 PROCEDURE — 51702 INSERT TEMP BLADDER CATH: CPT

## 2024-03-25 RX ORDER — LIDOCAINE HYDROCHLORIDE 20 MG/ML
1 JELLY TOPICAL ONCE
Status: COMPLETED | OUTPATIENT
Start: 2024-03-25 | End: 2024-03-25

## 2024-03-25 RX ADMIN — LIDOCAINE HYDROCHLORIDE 1 APPLICATION: 20 JELLY TOPICAL at 05:10

## 2024-03-25 ASSESSMENT — COLUMBIA-SUICIDE SEVERITY RATING SCALE - C-SSRS
1. IN THE PAST MONTH, HAVE YOU WISHED YOU WERE DEAD OR WISHED YOU COULD GO TO SLEEP AND NOT WAKE UP?: NO
6. HAVE YOU EVER DONE ANYTHING, STARTED TO DO ANYTHING, OR PREPARED TO DO ANYTHING TO END YOUR LIFE?: NO
2. HAVE YOU ACTUALLY HAD ANY THOUGHTS OF KILLING YOURSELF?: NO

## 2024-03-25 ASSESSMENT — PAIN SCALES - GENERAL: PAINLEVEL_OUTOF10: 0 - NO PAIN

## 2024-03-27 NOTE — ED PROVIDER NOTES
Dale Mcmahan is a 88 y.o. patient presenting to the ED for inability to urinate.  The patient states he has had issues with his prostate in the past.  He did have a procedure done and has not had any issues recently.  His urologic procedure was more than a year ago.  He has been unable to urinate throughout the night tonight.  He has been able to express some urine but does not feel like he is emptying his bladder throughout the day today.  He denies any recent fever or illness.  No burning with urination.  He does report severe pain that completely resolved after Pepe catheter placement.    Additional History Obtained from: Grandson at bedside  Limitations to History: None  ------------------------------------------------------------------------------------------------------------------------------------------  Physical Exam: (After Pepe catheter placement)  Appearance: Alert, cooperative,   Skin: Warm, dry, appropriate color for ethnicity.  Pulmonary: No accessory muscle use or stridor. Clear lung sounds bilaterally without rhonchi or wheezing.   Cardiac: Heart sounds regular without murmur. B/L radial pulses full and symmetric.   Abdomen: Soft, not tender.  No rebound or guarding.   Musculoskeletal: No gross deformities.   Neurological: Face symmetrical. Voice clear. Appropriately conversant.   Psychiatric: Appropriate mood and affect.    Medical Decision Making:  Chronic Medical Conditions Significantly Affecting Care:  has a past medical history of Epididymo-orchitis (02/13/2020), Impaired fasting glucose, Other shoulder lesions, right shoulder (02/19/2021), Pain, unspecified (11/01/2019), Personal history of other diseases of male genital organs (01/21/2022), Personal history of other diseases of male genital organs, Personal history of other diseases of the musculoskeletal system and connective tissue, Personal history of other diseases of the musculoskeletal system and connective tissue, Personal history  of other diseases of the nervous system and sense organs (05/02/2022), Personal history of other diseases of the nervous system and sense organs, Personal history of other diseases of the respiratory system, Personal history of other diseases of urinary system (05/02/2022), Personal history of other diseases of urinary system (05/02/2022), Personal history of other endocrine, nutritional and metabolic disease, Personal history of other endocrine, nutritional and metabolic disease, Personal history of other specified conditions (01/21/2022), Personal history of other specified conditions (01/21/2022), and Personal history of urinary (tract) infections (08/19/2020).    Social Determinants of Health Significantly Affecting Care: None identified    Differential Diagnosis Considered but not limited to: Acute urinary retention, urinary tract infection.  The patient did present quite hypertensive however I suspect this is secondary to acute pain given his urinary retention.      External Records Reviewed:   I reviewed recent and relevant outside records including:     Independent Interpretation of Studies: The following studies were ordered as part of the emergency department work up and independently interpreted by me. See ED Course for details.    Patient presenting unable to urinate with significant abdominal pain and hypertension.  Pepe catheter was placed by nursing staff after which the patient's symptoms completely resolved.  Urinalysis was sent and does show trace blood but no evidence of infection.    I discussed urine results with the patient and family at bedside.  I do believe he is stable for outpatient management.  We discussed follow-up with urology as well as Pepe catheter care and return precautions.    Diagnoses as of 03/27/24 1722   Urinary retention          Chen Mary DO  03/27/24 1725

## 2024-03-28 ENCOUNTER — OFFICE VISIT (OUTPATIENT)
Dept: UROLOGY | Facility: CLINIC | Age: 89
End: 2024-03-28
Payer: MEDICARE

## 2024-03-28 VITALS — SYSTOLIC BLOOD PRESSURE: 177 MMHG | DIASTOLIC BLOOD PRESSURE: 81 MMHG

## 2024-03-28 DIAGNOSIS — N40.1 BENIGN PROSTATIC HYPERPLASIA WITH LOWER URINARY TRACT SYMPTOMS, SYMPTOM DETAILS UNSPECIFIED: Primary | ICD-10-CM

## 2024-03-28 DIAGNOSIS — R33.9 URINARY RETENTION: ICD-10-CM

## 2024-03-28 PROCEDURE — 1159F MED LIST DOCD IN RCRD: CPT | Performed by: UROLOGY

## 2024-03-28 PROCEDURE — 1160F RVW MEDS BY RX/DR IN RCRD: CPT | Performed by: UROLOGY

## 2024-03-28 PROCEDURE — 99213 OFFICE O/P EST LOW 20 MIN: CPT | Performed by: UROLOGY

## 2024-03-28 NOTE — PROGRESS NOTES
03/28/2024  Patient developed urinary retention, status post the PVP    Exam: Pepe catheter in place, urine clear yellow    Pepe catheter was removed without difficulty    We discussed benign prostate hypertrophy, status post PVP, recurrent urinary we discussed the voiding trial retention  Continue Flomax 0.8 mg daily  All the questions were answered, the patient expressed understanding and agreed to the plan.    Impression  Unstable bladder  BPH“status post PVP  Bladder spasms   BPH  Nocturia  Elevated PSA, negative biopsy     Plan  Voiding trial today,  Keep yearly follow-up appointment  Possible repeat PVP in the future    Patient last saw Dr. Small 05/11/2023.  Chief Complaint   Patient presents with    Benign Prostatic Hypertrophy     Patient here for ER follow-up. He presented to the ER 03/25/2024 with urinary retention. Pepe catheter was placed. UA showed no signs of infection at ER. He is currently prescribed Tamsulosin 0.8 mg daily per Dr. Small. He has a hx of urinary obstruction associated with BPH s/p PVP done 05/10/2022.        Physical Exam     TODAYS LAB RESULTS:      ASSESSMENT&PLAN:      IMPRESSIONS:       5/11/2023 HBM  87-year-old male with a history of urinary retention his tamsulosin was increased from 0.4 to 0.8 mg daily patient states that he has a slightly stronger urinary stream he is still having some urinary frequency and nocturia x3 he has to wait after voiding and then occasionally he will start voiding again his postvoid residual today was 70 mL and his urinalysis was unremarkable. He is to continue on tamsulosin 0.8 mg daily and follow-up visit here in 6 months. Patient denies any side effects from the increased dose of tamsulosin to 0.8 he denies dizziness lightheadedness.   05/26/2022  Voiding okay, some frequency and nocturia     Patient has no nausea, no vomiting, no fever.     Patient here today for 1wk PVR following voiding trial done 5/19/22. Patient is status post PVP done  5/10/22.   Patient states he does have a lot of incontinence. He does have nocturia every hour and is wondering if there is something to help with this.He is still taking the tamsulosin 0.8mg daily. PVR 47ml  He did not leave urine sample today.     We discussed benign prostate hypertrophy, status post of PVP, unstable bladder symptom  All the questions were answered, the patient expressed understanding and agreed to the plan.     Impression  Unstable bladder  BPHâ€“status post PVP  Bladder spasms   BPH  Nocturia  Elevated PSA, negative biopsy     Plan  Time voiding, bladder training  Wean off Flomax 1 at a time  Appointment in 3 months        05/19/2022 addendum  Patient voids with slow stream, no blood     PVR 90 cc     Continue voiding trial, call back tomorrow morning        05/19/2022  Did well after PVP, some bladder spasm     Exam: Gan catheter in place, urine clear yellow     Catheter was removed without difficulty     We discussed the post PVP, voiding trial  All the questions were answered, the patient expressed understanding and agreed to the plan.     Impression  BPHâ€“status post PVP  Bladder spasms   BPH  Nocturia  Elevated PSA, negative biopsy     Plan  Voiding trial today, call back 3 PM  Appointment in 1 week for PVR           04/25/2022     Patient here today for 1 month follow up on CIC teaching. Patient was to do CIC TID, but failed and had issues with infection. He was inpatient 3/28-4/1/22 for retention and UTI- has gan catheter in place currently. He is scheduled for PVP on 5/10/22.   Patient had been having issues with leakage around the catheter for the past few days. Last change done while patient was in hospital 3/28. Removed and replaced 18FF coude [facility] Patient prepped with iodine. Catheter draining clear yellow urine and able to be flushed appropriately. Patient tolerated well. Patient to follow up as scheduled after he has PVP done.      Once again  we discussed the benign prostate hypertrophy, urinary retention, failed CIC, on schedule for PVP  All the questions were answered, the patient expressed understanding and agreed to the plan.     Impression  BPH  Bladder spasms   BPH  Nocturia  Elevated PSA, negative biopsy     Plan  Continue Pepe catheter  PVP greenlight laser prostatectomy  Appointment 1 week after           04/14/2022  Patient decided to proceed PVP greenlight laser prostatectomy     We discussed significant enlarged prostate, history of urinary retention  We discussed the cystoscopy and prostate ultrasound result  We discussed PVP greenlight laser prostatectomy, indication risk of benefit and alternative  All the questions were answered, the patient expressed understanding and agreed to the plan.     Impression  BPH  Bladder spasms   BPH  Nocturia  Elevated PSA, negative biopsy     Plan  Continue 0.8mg Flomax daily  PVP greenlight laser prostatectomy  Appointment 1 week after        03/24/2022  Patient declined PVP greenlight laser prostatectomy, would like to do CIC     Patient has no nausea, no vomiting, no fever.     Patient here today for follow up urinary retention. Patient c/o leaking around catheter with bowel movement,  [place] and would like to discuss self catheterization.      We discussed benign prostate hypertrophy, urinary retention,  We discussed the CIC, indication the risk of benefit and alternative, bleeding trauma etc.  All the questions were answered, the patient expressed understanding and agreed to the plan.     Impression  BPH  Bladder spasms   BPH  Nocturia  Elevated PSA, negative biopsy     Plan  CIC teaching today  CIC tid  Appointment in 1 month           02/24/2022  Patient voiding relatively okay     Cystoscopy     Cystoscopy was performed under local without difficulty:     Findings: Normal anterior urethra, significant enlarged prostate; significant trabeculated bladder; no tumor no stone     Pain  evaluation: 0/10 before, 2/10 after.     Prostate ultrasound.     Prostate volume 134 cc     Pain evaluation: 0/10 before, 2/10 after.     We discussed significant enlarged prostate, history of urinary retention  We discussed the cystoscopy and prostate ultrasound result  We discussed PVP greenlight laser prostatectomy, indication risk of benefit and alternative  All the questions were answered, the patient expressed understanding and agreed to the plan.     Impression  BPH  Bladder spasms   BPH  Nocturia  Elevated PSA, negative biopsy     Plan  Continue 0.8mg Flomax daily   PVP greenlight laser prostatectomy   Continue monitoring elevated PSA              02/11/22:   Patient originally here today on nurse schedule for 4wk indwelling gan change. He would like to discuss possible voiding trial instead.      Patient takes 0.8mg Flomax daily.     Patient has no nausea, vomiting, or fever      Exam: Gan catheter was removed without difficulty. urine clear yellow     We discussed recurring urinary retention, voiding trial, increase liquid intake, BPH workup followed by possible PVP greenlight laser prostatectomy, risks, benefits, alternatives   We discussed benign prostatic hyperplasia with mild voiding symptoms, continue 0.8mg Flomax daily  We discussed history of elevated PSA, stable, cessation of PSA due to age  All the questions were answered, the patient expressed understanding and agreed to the plan.     Impression  Urinary retention   Bladder spasms   BPH  Nocturia  Elevated PSA, negative biopsy     Plan  Voiding trial, ER if unable to void  Increase liquid intake  Continue 0.8mg Flomax daily  Keep Cystoscopy, prostate US  Possible PVP greenlight laser prostatectomy               01/21/22:   Patient here today for ED follow on 1/14/22 for urinary retention and gan cath was placed. Patient would like to review option for BPH. Patient inquires about self-catheterization. Failed voiding trial twice.     Patient  takes 0.8mg Flomax daily.     Patient has no nausea, vomiting, or fever      We discussed recurring urinary retention, keep gan catheter in place, BPH workup followed by possible PVP greenlight laser prostatectomy, risks, benefits, alternatives   We discussed benign prostatic hyperplasia with mild voiding symptoms, continue 0.8mg Flomax daily  We discussed history of elevated PSA, stable, cessation of PSA due to age  All the questions were answered, the patient expressed understanding and agreed to the plan.     Impression  Urinary retention   Bladder spasms   BPH  Nocturia  Elevated PSA, negative biopsy     Plan  Keep Gan catheter in place  Continue 0.8mg Flomax daily  Cystoscopy, prostate US  Possible PVP greenlight laser prostatectomy            12/01/21:   Patient is here today to void trial. Patient went to ED on 11/26/21 Due to him not being able to void and a gan cath was put in. This has occurred 3x prior. He denies hematuria, besides after catheter was placed.      Patient takes 0.8mg Flomax daily.     Patient has no nausea, vomiting, or fever      Exam: Catheter removed without difficulty, urine clear yellow     We discussed recurring urinary retention, gan catheter, voiding trial, increase liquid intake, call at 3pm  We discussed benign prostatic hyperplasia with mild voiding symptoms, continue 0.8mg Flomax daily  We discussed history of elevated PSA, stable, cessation of PSA due to age  All the questions were answered, the patient expressed understanding and agreed to the plan.     Impression  Urinary retention   Bladder spasms   BPH  Nocturia  Elevated PSA, negative biopsy     Plan  Voiding trial, call at 3pm  Increase liquid intake   Continue 0.8mg Flomax daily   No more PSA  Keep yearly appointment           03/12/21  Voiding well, nocturia 1-2 on Flomax 0.8 mg daily     Patient has no nausea, no vomiting, no fever.     SUAD: Deferred     No more PSA     PVR: 120 cc     IO UA (automated w/o  microscopy)           [id number] 10:52AM          Reji Bae     Test Name       Result     Flag        Reference  IO Glucose - Urine         Negative                  IO Bilirubin       Negative                  IO Ketones      Negative                  IO Specific Gravity         1.020                       IO Blood          Trace                       IO pH               7.0                           IO Protein, Urine            Negative                  IO Urobilinogen                                              Normal (0.2-1.0 mg/dl)  IO Nitrite, Urine              Negative                  IO Leukocytes Negative                  IO Glucose - Urine         Negative                  IO Bilirubin       Negative                  IO Ketones      Negative                  IO Specific Gravity         1.020                       IO Blood          Trace                       IO pH               7.0                           IO Protein, Urine            Negative                  IO Urobilinogen                                              Normal (0.2-1.0 mg/dl)  IO Nitrite, Urine              Negative                  IO Leukocytes Negative                                                            We discussed benign prostate hypertrophy, urinary retention, resolved, incomplete emptying  We discussed continue Flomax  All the questions were answered, the patient expressed understanding and agreed to the plan.     Impression  Urinary retention   Bladder spasms   BPH  Nocturia  Elevated PSA, negative biopsy     Plan  Continue Flomax 04 mg 2 tablets daily        Yearly SUAD and PSA        09/11/2020  Voiding well, stream current     Patient has no nausea, no vomiting, no fever.      cc     IO Ultrasound, measurement post-void resid urine and/or bl cap; no imag         [id number] 01:26PM          Reji Bae     Test Name       Result     Flag        Reference  IO Ultrasound, measurement post void resid urine and/or bl  cap; non-imag       238 ml/min                                                                                                                    IO UA (automated w/o microscopy)           [id number] 01:18PM          Reji Bae     Test Name       Result     Flag        Reference  IO Glucose - Urine         Negative                  IO Bilirubin       Negative                  IO Ketones      Negative                  IO Specific Gravity         1.015                       IO Blood          (+)small - 15                          IO pH               6.0                           IO Protein, Urine            Negative                  IO Urobilinogen                                              Normal (0.2-1.0 mg/dl)  IO Nitrite, Urine              Negative                  IO Leukocytes Negative                  IO Glucose - Urine         Negative                  IO Bilirubin       Negative                  IO Ketones      Negative                  IO Specific Gravity         1.015                       IO Blood          (+)small - 15                          IO pH               6.0                           IO Protein, Urine            Negative                  IO Urobilinogen                                              Normal (0.2-1.0 mg/dl)  IO Nitrite, Urine              Negative                  IO Leukocytes Negative      We discussed benign prostate hypertrophy, urinary retention, resolved, incomplete emptying  We discussed continue Flomax  All the questions were answered, the patient expressed understanding and agreed to the plan.     Impression  Urinary retention   Bladder spasms   BPH  Nocturia  Elevated PSA, negative biopsy     Plan  Continue Flomax 04 mg 2 tablets daily        Appointment in 6 months           09/04/2020  Patient developed urinary retention and had a Pepe catheter in for 2 weeks, on doxazosin and oxybutynin     Patient has no nausea, no vomiting, no fever.     Exam: Pepe catheter in  "place, urine clear yellow     Pepe catheter was removed without difficulty        PSA 8/2/20 8.27. Stable     We discussed benign prostate hypertrophy, urinary retention, voiding trial, Flomax 2 tablets daily  All the questions were answered, the patient expressed understanding and agreed to the plan.     Impression  Urinary retention   Bladder spasms   BPH  Nocturia  Elevated PSA, negative biopsy     Plan  Voiding trial today, ER if unable to void  DC oxybutynin  DC doxazosin  Flomax 04 mg 2 tablets daily  Appointment in 1 week for PVR  Possible BPH workup     I spent 25 minutes with this patient. Greater than 50% of this time was spent in counseling and/or coordination of care        8/19/2020 Cindy Barrow CNP      85 year old male patient here today due to isues with catheter not draining correctly, especially when standing up. He was seen in the ER on 8/16/20 due urinary retention and Pepe catheter was placed at that time. He has failed void trials in the past. He was last seen in the office 9/6/19 per Dr. Bae for urinary retention, PVR was WNL at that time/ Patient states since a few hours after having catheter placed, he has been having discomfort around catheter insertion site. He doesn't think that the catheter is draining correctly. Sometimes he will feel the urge to urinate and \"push\" he will leakage around his catheter. He states that everything is fine when he is laying down and his catheter is draining appropriately. Flushed catheter without difficulty. Catheter secure is pulling on catheter. Drainage bag also noted to be below his knee. Patient has erythema to inner right thigh due to catheter pulling his penis. Asked patient to not use the cath secure and to keep his drainage bag above his knee. I believe that he is having bladder spasms from pulling on the catheter. I will start an antibiotic due to possible UTI. Will make follow up appointment for void trial/to discuss PVP. All the questions were " answered, the patient expressed understanding and agreed to the plan.     Impression  Urinary retention   Bladder spasms   BPH  Nocturia  Elevated PSA, negative biopsy     Plan  Continue Pepe catheter, avoid pulling on catheter, keep bag above knee  Start Keflex 500 mg BID #14   Void trial in 1-2 weeks   PSA overdue   Possible PVP in the future           I spent 25 minutes with this patient. Greater than 50% of this time was spent in counseling and/or coordination of care              09/06/2019  Patient voiding well during the day, but still nocturia 1-2 hours, on doxazosin 4 mg daily     Patient has no nausea, no vomiting, no fever.     PVR: Minimal 65 cc     We discussed benign prostate hypertrophy, nocturia, minimal PVR, conservative management versus PVP  All the questions were answered, the patient expressed understanding and agreed to the plan.     Impression  BPH  Nocturia  Elevated PSA, negative biopsy     Plan  Conservative management  Cutback liquid intake after 6 PM  Yearly SUAD and a PSA  Possible PVP in the future           08/29/2019  Patient here for post-prostate biopsy follow-up     Patient did well post biopsy     Pathology: No malignancy     Exam: Pepe catheter in place, clear yellow urine     We discussed pathology  We discussed benign prostate hypertrophy very large prostate, all Flomax, voiding trial one more time, possible PVP greenlight laser prostatectomy     Impression  Left epididymoorchitis, acute  BPH, intractable urinary retention  Elevated PSA     Plan  Voiding trial today, call back 3 PM  Appointment in 1 week for PVR  Possible greenlight laser prostatectomy     I spent 25 minutes with this patient. Greater than 50% of this time was spent in counseling and/or coordination of care        07/19/2019  Transrectal ultrasound-guided prostate biopsy     Biopsy was performed under local without difficulty     A total 12 cores specimen obtained     Prostate volume 142 cc     Pain  evaluation: 0/10 before, 2/10 after.     We discussed the post biopsy instructions, continue Gan catheter insertion  All the questions were answered, the patient expressed understanding and agreed to the plan.     Impression  Left epididymoorchitis, acute  BPH, intractable urinary retention  Elevated PSA     Plan  Await pathology  Continue Gan catheter indwelling  Possible PVP greenlight laser prostatectomy  Appointment in 2 weeks           06/24/2019  Patient failed voiding trial, Gan catheter was inserted into ER, also complaining left scrotal pain.     Patient has no nausea, no vomiting, no fever.     Exam: Left testicle is 3 times enlarged, tender, right testicle normal     Gan catheter, concentrated urine     Scrotal ultrasound  IMPRESSION:  1. The left epididymis appears hypervascular suggesting epididymitis. There is  also mild increase in vascularity of the left testicle which may be relate to  orchitis.     2. Moderate to large size complicated left-sided hydrocele with internal  septation, infections or hemorrhage is not excluded.     3. There is an indeterminate 5 x 4 x 4 mm cystic lesion in the right testicle.  Close continued surveillance is recommended.     4. Moderate-sized hydrocele with internal debris is present in the right  hemiscrotum.     Impression  Left epididymoorchitis, acute  BPH, intractable urinary retention  Elevated PSA     Plan  Bactrim DS 1 tablet twice day for 3 weeks  Prostate transrectal ultrasound-guided biopsy in 3 weeks  Cystoscopy later  Possible PVP greenlight laser prostatectomy     I spent 25 minutes with this patient. Greater than 50% of this time was spent in counseling and/or coordination of care        06/17/2019  84-year-old white male developed acute onset of urinary retention, gan catheter was inserted in the emergency room a week ago. The catheter was working well     Patient has no nausea, no vomiting, no fever.     Exam: Gan catheter clear yellow,  removed without difficulty     We discussed benign prostate hypertrophy, urinary retention, voiding trial, possible BPH workup in the future. All the questions were answered, the patient expressed understanding and agreed to the plan.     Impression  BPH  Acute urinary retention     Plan  Continue Flomax 0.4 mg daily  Voiding trial today callbacks 3 PM  Appointment in 1 week for PVR              PSA  8/2/20 8.27.  12/24/18 7.90     Surgery  5/10/2022 PVP greenlight laser prostatectomy  7/19/19 prostate biopsy negative

## 2024-03-29 ENCOUNTER — OFFICE VISIT (OUTPATIENT)
Dept: UROLOGY | Facility: CLINIC | Age: 89
End: 2024-03-29
Payer: MEDICARE

## 2024-03-29 DIAGNOSIS — N40.0 BENIGN PROSTATIC HYPERPLASIA, UNSPECIFIED WHETHER LOWER URINARY TRACT SYMPTOMS PRESENT: ICD-10-CM

## 2024-03-29 LAB
POC BILIRUBIN, URINE: NEGATIVE
POC BLOOD, URINE: ABNORMAL
POC GLUCOSE, URINE: NEGATIVE MG/DL
POC KETONES, URINE: NEGATIVE MG/DL
POC LEUKOCYTES, URINE: NEGATIVE
POC NITRITE,URINE: NEGATIVE
POC PH, URINE: 5.5 PH
POC PROTEIN, URINE: ABNORMAL MG/DL
POC SPECIFIC GRAVITY, URINE: 1.02
POC UROBILINOGEN, URINE: 1 EU/DL

## 2024-03-29 PROCEDURE — 99212 OFFICE O/P EST SF 10 MIN: CPT | Performed by: UROLOGY

## 2024-03-29 PROCEDURE — 1160F RVW MEDS BY RX/DR IN RCRD: CPT | Performed by: UROLOGY

## 2024-03-29 PROCEDURE — 81002 URINALYSIS NONAUTO W/O SCOPE: CPT | Performed by: UROLOGY

## 2024-03-29 PROCEDURE — 1159F MED LIST DOCD IN RCRD: CPT | Performed by: UROLOGY

## 2024-03-29 NOTE — PROGRESS NOTES
03/29/2024  No voiding well subjectively, PVR 53 ml    We discussed the benign prostate hypertrophy, status post show urinary retention voiding trial continue  All the questions were answered, the patient expressed understanding and agreed to the plan.    Impression  Unstable bladder  BPH“status post PVP  Bladder spasms   BPH  Nocturia  Elevated PSA, negative biopsy     Plan  Increase liquid intake  Watch voiding, call 2 PM this afternoon      Chief Complaint   Patient presents with    Benign Prostatic Hypertrophy     Patient had TOV yesterday. Nocturia 4-5x last night. He does not feel like he is emptying his bladder, however, PVR was low. He is drinking 3-4 cups of coffee each morning.         Physical Exam     TODAYS LAB RESULTS:    PVR 53 mL    POC Glucose, Urine  NEGATIVE mg/dl NEGATIVE   POC Bilirubin, Urine  NEGATIVE NEGATIVE   POC Ketones, Urine  NEGATIVE mg/dl NEGATIVE   POC Specific Gravity, Urine  1.005 - 1.035 1.025   POC Blood, Urine  NEGATIVE LARGE (3+) Abnormal    POC PH, Urine  No Reference Range Established PH 5.5   POC Protein, Urine  NEGATIVE, 30 (1+) mg/dl 100 (2+) Abnormal    POC Urobilinogen, Urine  0.2, 1.0 EU/DL 1.0   Poc Nitrite, Urine  NEGATIVE NEGATIVE   POC Leukocytes, Urine  NEGATIVE NEGATIVE     ASSESSMENT&PLAN:      IMPRESSIONS:      03/28/2024  Patient developed urinary retention, status post the PVP     Exam: Pepe catheter in place, urine clear yellow     Pepe catheter was removed without difficulty     We discussed benign prostate hypertrophy, status post PVP, recurrent urinary we discussed the voiding trial retention  Continue Flomax 0.8 mg daily  All the questions were answered, the patient expressed understanding and agreed to the plan.     Impression  Unstable bladder  BPH“status post PVP  Bladder spasms   BPH  Nocturia  Elevated PSA, negative biopsy     Plan  Voiding trial today,  Keep yearly follow-up appointment  Possible repeat PVP in the future     Patient last saw   Staci 05/11/2023.       Chief Complaint   Patient presents with    Benign Prostatic Hypertrophy       Patient here for ER follow-up. He presented to the ER 03/25/2024 with urinary retention. Pepe catheter was placed. UA showed no signs of infection at ER. He is currently prescribed Tamsulosin 0.8 mg daily per Dr. Small. He has a hx of urinary obstruction associated with BPH s/p PVP done 05/10/2022.         Physical Exam      TODAYS LAB RESULTS:        ASSESSMENT&PLAN:        IMPRESSIONS:        5/11/2023 HBM  87-year-old male with a history of urinary retention his tamsulosin was increased from 0.4 to 0.8 mg daily patient states that he has a slightly stronger urinary stream he is still having some urinary frequency and nocturia x3 he has to wait after voiding and then occasionally he will start voiding again his postvoid residual today was 70 mL and his urinalysis was unremarkable. He is to continue on tamsulosin 0.8 mg daily and follow-up visit here in 6 months. Patient denies any side effects from the increased dose of tamsulosin to 0.8 he denies dizziness lightheadedness.   05/26/2022  Voiding okay, some frequency and nocturia     Patient has no nausea, no vomiting, no fever.     Patient here today for 1wk PVR following voiding trial done 5/19/22. Patient is status post PVP done 5/10/22.   Patient states he does have a lot of incontinence. He does have nocturia every hour and is wondering if there is something to help with this.He is still taking the tamsulosin 0.8mg daily. PVR 47ml  He did not leave urine sample today.     We discussed benign prostate hypertrophy, status post of PVP, unstable bladder symptom  All the questions were answered, the patient expressed understanding and agreed to the plan.     Impression  Unstable bladder  BPHâ€“status post PVP  Bladder spasms   BPH  Nocturia  Elevated PSA, negative biopsy     Plan  Time voiding, bladder training  Wean off Flomax 1 at a time  Appointment in 3  months        05/19/2022 addendum  Patient voids with slow stream, no blood     PVR 90 cc     Continue voiding trial, call back tomorrow morning        05/19/2022  Did well after PVP, some bladder spasm     Exam: Gan catheter in place, urine clear yellow     Catheter was removed without difficulty     We discussed the post PVP, voiding trial  All the questions were answered, the patient expressed understanding and agreed to the plan.     Impression  BPHâ€“status post PVP  Bladder spasms   BPH  Nocturia  Elevated PSA, negative biopsy     Plan  Voiding trial today, call back 3 PM  Appointment in 1 week for PVR           04/25/2022     Patient here today for 1 month follow up on CIC teaching. Patient was to do CIC TID, but failed and had issues with infection. He was inpatient 3/28-4/1/22 for retention and UTI- has gan catheter in place currently. He is scheduled for PVP on 5/10/22.   Patient had been having issues with leakage around the catheter for the past few days. Last change done while patient was in hospital 3/28. Removed and replaced 18FF coude with 10cc [facility] Patient prepped with iodine. Catheter draining clear yellow urine and able to be flushed appropriately. Patient tolerated well. Patient to follow up as scheduled after he has PVP done.      Once again we discussed the benign prostate hypertrophy, urinary retention, failed CIC, on schedule for PVP  All the questions were answered, the patient expressed understanding and agreed to the plan.     Impression  BPH  Bladder spasms   BPH  Nocturia  Elevated PSA, negative biopsy     Plan  Continue Gan catheter  PVP greenlight laser prostatectomy  Appointment 1 week after           04/14/2022  Patient decided to proceed PVP greenlight laser prostatectomy     We discussed significant enlarged prostate, history of urinary retention  We discussed the cystoscopy and prostate ultrasound result  We discussed PVP greenlight laser  prostatectomy, indication risk of benefit and alternative  All the questions were answered, the patient expressed understanding and agreed to the plan.     Impression  BPH  Bladder spasms   BPH  Nocturia  Elevated PSA, negative biopsy     Plan  Continue 0.8mg Flomax daily  PVP greenlight laser prostatectomy  Appointment 1 week after        03/24/2022  Patient declined PVP greenlight laser prostatectomy, would like to do CIC     Patient has no nausea, no vomiting, no fever.     Patient here today for follow up urinary retention. Patient c/o leaking around catheter with bowel movement,  [place] and would like to discuss self catheterization.      We discussed benign prostate hypertrophy, urinary retention,  We discussed the CIC, indication the risk of benefit and alternative, bleeding trauma etc.  All the questions were answered, the patient expressed understanding and agreed to the plan.     Impression  BPH  Bladder spasms   BPH  Nocturia  Elevated PSA, negative biopsy     Plan  CIC teaching today  CIC tid  Appointment in 1 month           02/24/2022  Patient voiding relatively okay     Cystoscopy     Cystoscopy was performed under local without difficulty:     Findings: Normal anterior urethra, significant enlarged prostate; significant trabeculated bladder; no tumor no stone     Pain evaluation: 0/10 before, 2/10 after.     Prostate ultrasound.     Prostate volume 134 cc     Pain evaluation: 0/10 before, 2/10 after.     We discussed significant enlarged prostate, history of urinary retention  We discussed the cystoscopy and prostate ultrasound result  We discussed PVP greenlight laser prostatectomy, indication risk of benefit and alternative  All the questions were answered, the patient expressed understanding and agreed to the plan.     Impression  BPH  Bladder spasms   BPH  Nocturia  Elevated PSA, negative biopsy     Plan  Continue 0.8mg Flomax daily   PVP greenlight laser prostatectomy    Continue monitoring elevated PSA              02/11/22:   Patient originally here today on nurse schedule for 4wk indwelling gan change. He would like to discuss possible voiding trial instead.      Patient takes 0.8mg Flomax daily.     Patient has no nausea, vomiting, or fever      Exam: Gan catheter was removed without difficulty. urine clear yellow     We discussed recurring urinary retention, voiding trial, increase liquid intake, BPH workup followed by possible PVP greenlight laser prostatectomy, risks, benefits, alternatives   We discussed benign prostatic hyperplasia with mild voiding symptoms, continue 0.8mg Flomax daily  We discussed history of elevated PSA, stable, cessation of PSA due to age  All the questions were answered, the patient expressed understanding and agreed to the plan.     Impression  Urinary retention   Bladder spasms   BPH  Nocturia  Elevated PSA, negative biopsy     Plan  Voiding trial, ER if unable to void  Increase liquid intake  Continue 0.8mg Flomax daily  Keep Cystoscopy, prostate US  Possible PVP greenlight laser prostatectomy               01/21/22:   Patient here today for ED follow on 1/14/22 for urinary retention and gan cath was placed. Patient would like to review option for BPH. Patient inquires about self-catheterization. Failed voiding trial twice.     Patient takes 0.8mg Flomax daily.     Patient has no nausea, vomiting, or fever      We discussed recurring urinary retention, keep gan catheter in place, BPH workup followed by possible PVP greenlight laser prostatectomy, risks, benefits, alternatives   We discussed benign prostatic hyperplasia with mild voiding symptoms, continue 0.8mg Flomax daily  We discussed history of elevated PSA, stable, cessation of PSA due to age  All the questions were answered, the patient expressed understanding and agreed to the plan.     Impression  Urinary retention   Bladder spasms   BPH  Nocturia  Elevated PSA, negative biopsy      Plan  Keep Gan catheter in place  Continue 0.8mg Flomax daily  Cystoscopy, prostate US  Possible PVP greenlight laser prostatectomy            12/01/21:   Patient is here today to void trial. Patient went to ED on 11/26/21 Due to him not being able to void and a gan cath was put in. This has occurred 3x prior. He denies hematuria, besides after catheter was placed.      Patient takes 0.8mg Flomax daily.     Patient has no nausea, vomiting, or fever      Exam: Catheter removed without difficulty, urine clear yellow     We discussed recurring urinary retention, gan catheter, voiding trial, increase liquid intake, call at 3pm  We discussed benign prostatic hyperplasia with mild voiding symptoms, continue 0.8mg Flomax daily  We discussed history of elevated PSA, stable, cessation of PSA due to age  All the questions were answered, the patient expressed understanding and agreed to the plan.     Impression  Urinary retention   Bladder spasms   BPH  Nocturia  Elevated PSA, negative biopsy     Plan  Voiding trial, call at 3pm  Increase liquid intake   Continue 0.8mg Flomax daily   No more PSA  Keep yearly appointment           03/12/21  Voiding well, nocturia 1-2 on Flomax 0.8 mg daily     Patient has no nausea, no vomiting, no fever.     SUAD: Deferred     No more PSA     PVR: 120 cc     IO UA (automated w/o microscopy)           12Mar2021 10:52AM          Reji Bae     Test Name       Result     Flag        Reference  IO Glucose - Urine         Negative                  IO Bilirubin       Negative                  IO Ketones      Negative                  IO Specific Gravity         1.020                       IO Blood          Trace                       IO pH               7.0                           IO Protein, Urine            Negative                  IO Urobilinogen                                              Normal (0.2-1.0 mg/dl)  IO Nitrite, Urine              Negative                  IO Leukocytes  Negative                  IO Glucose - Urine         Negative                  IO Bilirubin       Negative                  IO Ketones      Negative                  IO Specific Gravity         1.020                       IO Blood          Trace                       IO pH               7.0                           IO Protein, Urine            Negative                  IO Urobilinogen                                              Normal (0.2-1.0 mg/dl)  IO Nitrite, Urine              Negative                  IO Leukocytes Negative                                                            We discussed benign prostate hypertrophy, urinary retention, resolved, incomplete emptying  We discussed continue Flomax  All the questions were answered, the patient expressed understanding and agreed to the plan.     Impression  Urinary retention   Bladder spasms   BPH  Nocturia  Elevated PSA, negative biopsy     Plan  Continue Flomax 04 mg 2 tablets daily        Yearly SUAD and PSA        09/11/2020  Voiding well, stream current     Patient has no nausea, no vomiting, no fever.      cc     IO Ultrasound, measurement post-void resid urine and/or bl cap; no imag         [id number] 01:26PM          Reji Bae     Test Name       Result     Flag        Reference  IO Ultrasound, measurement post void resid urine and/or bl cap; non-imag       238 ml/min                                                                                                                    IO UA (automated w/o microscopy)           [id number] 01:18PM          Reji Bae     Test Name       Result     Flag        Reference  IO Glucose - Urine         Negative                  IO Bilirubin       Negative                  IO Ketones      Negative                  IO Specific Gravity         1.015                       IO Blood          (+)small - 15                          IO pH               6.0                           IO Protein, Urine             Negative                  IO Urobilinogen                                              Normal (0.2-1.0 mg/dl)  IO Nitrite, Urine              Negative                  IO Leukocytes Negative                  IO Glucose - Urine         Negative                  IO Bilirubin       Negative                  IO Ketones      Negative                  IO Specific Gravity         1.015                       IO Blood          (+)small - 15                          IO pH               6.0                           IO Protein, Urine            Negative                  IO Urobilinogen                                              Normal (0.2-1.0 mg/dl)  IO Nitrite, Urine              Negative                  IO Leukocytes Negative      We discussed benign prostate hypertrophy, urinary retention, resolved, incomplete emptying  We discussed continue Flomax  All the questions were answered, the patient expressed understanding and agreed to the plan.     Impression  Urinary retention   Bladder spasms   BPH  Nocturia  Elevated PSA, negative biopsy     Plan  Continue Flomax 04 mg 2 tablets daily        Appointment in 6 months           09/04/2020  Patient developed urinary retention and had a Pepe catheter in for 2 weeks, on doxazosin and oxybutynin     Patient has no nausea, no vomiting, no fever.     Exam: Pepe catheter in place, urine clear yellow     Pepe catheter was removed without difficulty        PSA 8/2/20 8.27. Stable     We discussed benign prostate hypertrophy, urinary retention, voiding trial, Flomax 2 tablets daily  All the questions were answered, the patient expressed understanding and agreed to the plan.     Impression  Urinary retention   Bladder spasms   BPH  Nocturia  Elevated PSA, negative biopsy     Plan  Voiding trial today, ER if unable to void  DC oxybutynin  DC doxazosin  Flomax 04 mg 2 tablets daily  Appointment in 1 week for PVR  Possible BPH workup     I spent 25 minutes with this patient. Greater  "than 50% of this time was spent in counseling and/or coordination of care        8/19/2020 Cindy Barrow CNP      85 year old male patient here today due to isues with catheter not draining correctly, especially when standing up. He was seen in the ER on 8/16/20 due urinary retention and Pepe catheter was placed at that time. He has failed void trials in the past. He was last seen in the office 9/6/19 per Dr. Bae for urinary retention, PVR was WNL at that time/ Patient states since a few hours after having catheter placed, he has been having discomfort around catheter insertion site. He doesn't think that the catheter is draining correctly. Sometimes he will feel the urge to urinate and \"push\" he will leakage around his catheter. He states that everything is fine when he is laying down and his catheter is draining appropriately. Flushed catheter without difficulty. Catheter secure is pulling on catheter. Drainage bag also noted to be below his knee. Patient has erythema to inner right thigh due to catheter pulling his penis. Asked patient to not use the cath secure and to keep his drainage bag above his knee. I believe that he is having bladder spasms from pulling on the catheter. I will start an antibiotic due to possible UTI. Will make follow up appointment for void trial/to discuss PVP. All the questions were answered, the patient expressed understanding and agreed to the plan.     Impression  Urinary retention   Bladder spasms   BPH  Nocturia  Elevated PSA, negative biopsy     Plan  Continue Pepe catheter, avoid pulling on catheter, keep bag above knee  Start Keflex 500 mg BID #14   Void trial in 1-2 weeks   PSA overdue   Possible PVP in the future           I spent 25 minutes with this patient. Greater than 50% of this time was spent in counseling and/or coordination of care              09/06/2019  Patient voiding well during the day, but still nocturia 1-2 hours, on doxazosin 4 mg daily     Patient has no " nausea, no vomiting, no fever.     PVR: Minimal 65 cc     We discussed benign prostate hypertrophy, nocturia, minimal PVR, conservative management versus PVP  All the questions were answered, the patient expressed understanding and agreed to the plan.     Impression  BPH  Nocturia  Elevated PSA, negative biopsy     Plan  Conservative management  Cutback liquid intake after 6 PM  Yearly SUAD and a PSA  Possible PVP in the future           08/29/2019  Patient here for post-prostate biopsy follow-up     Patient did well post biopsy     Pathology: No malignancy     Exam: Pepe catheter in place, clear yellow urine     We discussed pathology  We discussed benign prostate hypertrophy very large prostate, all Flomax, voiding trial one more time, possible PVP greenlight laser prostatectomy     Impression  Left epididymoorchitis, acute  BPH, intractable urinary retention  Elevated PSA     Plan  Voiding trial today, call back 3 PM  Appointment in 1 week for PVR  Possible greenlight laser prostatectomy     I spent 25 minutes with this patient. Greater than 50% of this time was spent in counseling and/or coordination of care        07/19/2019  Transrectal ultrasound-guided prostate biopsy     Biopsy was performed under local without difficulty     A total 12 cores specimen obtained     Prostate volume 142 cc     Pain evaluation: 0/10 before, 2/10 after.     We discussed the post biopsy instructions, continue Pepe catheter insertion  All the questions were answered, the patient expressed understanding and agreed to the plan.     Impression  Left epididymoorchitis, acute  BPH, intractable urinary retention  Elevated PSA     Plan  Await pathology  Continue Pepe catheter indwelling  Possible PVP greenlight laser prostatectomy  Appointment in 2 weeks           06/24/2019  Patient failed voiding trial, Pepe catheter was inserted into ER, also complaining left scrotal pain.     Patient has no nausea, no vomiting, no fever.     Exam:  Left testicle is 3 times enlarged, tender, right testicle normal     Gan catheter, concentrated urine     Scrotal ultrasound  IMPRESSION:  1. The left epididymis appears hypervascular suggesting epididymitis. There is  also mild increase in vascularity of the left testicle which may be relate to  orchitis.     2. Moderate to large size complicated left-sided hydrocele with internal  septation, infections or hemorrhage is not excluded.     3. There is an indeterminate 5 x 4 x 4 mm cystic lesion in the right testicle.  Close continued surveillance is recommended.     4. Moderate-sized hydrocele with internal debris is present in the right  hemiscrotum.     Impression  Left epididymoorchitis, acute  BPH, intractable urinary retention  Elevated PSA     Plan  Bactrim DS 1 tablet twice day for 3 weeks  Prostate transrectal ultrasound-guided biopsy in 3 weeks  Cystoscopy later  Possible PVP greenlight laser prostatectomy     I spent 25 minutes with this patient. Greater than 50% of this time was spent in counseling and/or coordination of care        06/17/2019  84-year-old white male developed acute onset of urinary retention, gan catheter was inserted in the emergency room a week ago. The catheter was working well     Patient has no nausea, no vomiting, no fever.     Exam: Gan catheter clear yellow, removed without difficulty     We discussed benign prostate hypertrophy, urinary retention, voiding trial, possible BPH workup in the future. All the questions were answered, the patient expressed understanding and agreed to the plan.     Impression  BPH  Acute urinary retention     Plan  Continue Flomax 0.4 mg daily  Voiding trial today callbacks 3 PM  Appointment in 1 week for PVR              PSA  8/2/20 8.27.  12/24/18 7.90     Surgery  5/10/2022 PVP greenlight laser prostatectomy  7/19/19 prostate biopsy negative

## 2024-04-01 ENCOUNTER — HOSPITAL ENCOUNTER (EMERGENCY)
Facility: HOSPITAL | Age: 89
Discharge: HOME | End: 2024-04-01
Attending: STUDENT IN AN ORGANIZED HEALTH CARE EDUCATION/TRAINING PROGRAM
Payer: MEDICARE

## 2024-04-01 VITALS
HEIGHT: 71 IN | BODY MASS INDEX: 22.26 KG/M2 | TEMPERATURE: 97.7 F | WEIGHT: 159 LBS | HEART RATE: 84 BPM | OXYGEN SATURATION: 97 % | DIASTOLIC BLOOD PRESSURE: 77 MMHG | RESPIRATION RATE: 16 BRPM | SYSTOLIC BLOOD PRESSURE: 145 MMHG

## 2024-04-01 DIAGNOSIS — R10.2 SUPRAPUBIC PRESSURE: Primary | ICD-10-CM

## 2024-04-01 DIAGNOSIS — R39.198 DIFFICULTY URINATING: ICD-10-CM

## 2024-04-01 LAB
APPEARANCE UR: CLEAR
BILIRUB UR STRIP.AUTO-MCNC: NEGATIVE MG/DL
COLOR UR: YELLOW
GLUCOSE UR STRIP.AUTO-MCNC: NEGATIVE MG/DL
HOLD SPECIMEN: 293
KETONES UR STRIP.AUTO-MCNC: ABNORMAL MG/DL
LEUKOCYTE ESTERASE UR QL STRIP.AUTO: NEGATIVE
NITRITE UR QL STRIP.AUTO: NEGATIVE
PH UR STRIP.AUTO: 5 [PH]
PROT UR STRIP.AUTO-MCNC: ABNORMAL MG/DL
RBC # UR STRIP.AUTO: ABNORMAL /UL
RBC #/AREA URNS AUTO: NORMAL /HPF
SP GR UR STRIP.AUTO: 1.02
UROBILINOGEN UR STRIP.AUTO-MCNC: 2 MG/DL
WBC #/AREA URNS AUTO: NORMAL /HPF

## 2024-04-01 PROCEDURE — 81001 URINALYSIS AUTO W/SCOPE: CPT | Performed by: NURSE PRACTITIONER

## 2024-04-01 PROCEDURE — 51798 US URINE CAPACITY MEASURE: CPT

## 2024-04-01 PROCEDURE — 51702 INSERT TEMP BLADDER CATH: CPT

## 2024-04-01 PROCEDURE — 99283 EMERGENCY DEPT VISIT LOW MDM: CPT

## 2024-04-01 ASSESSMENT — COLUMBIA-SUICIDE SEVERITY RATING SCALE - C-SSRS
2. HAVE YOU ACTUALLY HAD ANY THOUGHTS OF KILLING YOURSELF?: NO
1. IN THE PAST MONTH, HAVE YOU WISHED YOU WERE DEAD OR WISHED YOU COULD GO TO SLEEP AND NOT WAKE UP?: NO
6. HAVE YOU EVER DONE ANYTHING, STARTED TO DO ANYTHING, OR PREPARED TO DO ANYTHING TO END YOUR LIFE?: NO

## 2024-04-01 ASSESSMENT — PAIN DESCRIPTION - LOCATION: LOCATION: GROIN

## 2024-04-01 ASSESSMENT — PAIN SCALES - GENERAL: PAINLEVEL_OUTOF10: 10 - WORST POSSIBLE PAIN

## 2024-04-01 ASSESSMENT — PAIN DESCRIPTION - PAIN TYPE: TYPE: CHRONIC PAIN

## 2024-04-01 ASSESSMENT — VISUAL ACUITY: OU: 1

## 2024-04-01 ASSESSMENT — PAIN - FUNCTIONAL ASSESSMENT: PAIN_FUNCTIONAL_ASSESSMENT: 0-10

## 2024-04-02 NOTE — ED PROVIDER NOTES
HPI   Chief Complaint   Patient presents with    Urinary Retention     Had seen his urologist who removed his cath on 3/28/24 and was urged to return if urinary difficulty was occurring. Pt is currently having pain and discomfort.        Patient presents to the emergency department requesting an insertion of a Pepe catheter.  Patient has known prostate problems and follows with Dr. Bae for urology.  He was last evaluated in the office on March 29 after an ER visit with Pepe placement on March 25 due to urinary retention.  His Pepe catheter was removed on the 29th and he has been instructed to return to the emergency department or office as needed for retention.  Patient has been struggling to urinate ever since removal of the catheter which he asked to be removed due to discomfort.  He was having issues with the leg bag when he walked due to how it was fixated to the leg.  He states today if it could be secured in a different manner, he would be able to tolerate the Pepe catheter better.  He states he has been able to urinate however he has to strain and push to get urine to come out.  He does not have significant discomfort secondary to feeling overly full from urinary retention however he knows it is going that direction so he figured he come to the emergency department now rather than in the middle of the night.  There is been no associated fever, chills, myalgias, malaise, chest pain, shortness of breath, nausea, vomiting, bowel changes, abdominal pain, dysuria, hematuria, testicular pain or swelling nor has he had back pain.  He presents in the care of his family as he is hard of hearing and they are familiar with his needs.  His symptoms are mild to moderate in severity and persistent nature.      History provided by:  Relative and patient   used: No                          Yadi Coma Scale Score: 15                  Patient History   Past Medical History:   Diagnosis Date     Epididymo-orchitis 02/13/2020    Epididymoorchitis    Impaired fasting glucose     Impaired fasting blood sugar    Other shoulder lesions, right shoulder 02/19/2021    Rotator cuff tendonitis, right    Pain, unspecified 11/01/2019    Pain    Personal history of other diseases of male genital organs 01/21/2022    History of benign prostatic hyperplasia    Personal history of other diseases of male genital organs     History of prostatitis    Personal history of other diseases of the musculoskeletal system and connective tissue     History of arthritis    Personal history of other diseases of the musculoskeletal system and connective tissue     History of chronic back pain    Personal history of other diseases of the nervous system and sense organs 05/02/2022    History of hearing loss    Personal history of other diseases of the nervous system and sense organs     History of presbycusis    Personal history of other diseases of the respiratory system     History of chronic obstructive lung disease    Personal history of other diseases of urinary system 05/02/2022    History of hematuria    Personal history of other diseases of urinary system 05/02/2022    History of blood in urine    Personal history of other endocrine, nutritional and metabolic disease     H/O hyperlipidemia    Personal history of other endocrine, nutritional and metabolic disease     History of hypothyroidism    Personal history of other specified conditions 01/21/2022    History of dysuria    Personal history of other specified conditions 01/21/2022    History of urinary retention    Personal history of urinary (tract) infections 08/19/2020    History of urinary tract infection     Past Surgical History:   Procedure Laterality Date    OTHER SURGICAL HISTORY  06/17/2019    No history of surgery     Family History   Family history unknown: Yes     Social History     Tobacco Use    Smoking status: Former     Types: Cigarettes    Smokeless tobacco:  "Never   Vaping Use    Vaping Use: Never used   Substance Use Topics    Alcohol use: Yes    Drug use: Never       Physical Exam   Visit Vitals  /77 (BP Location: Left arm, Patient Position: Sitting)   Pulse 84   Temp 36.5 °C (97.7 °F) (Temporal)   Resp 16   Ht 1.803 m (5' 11\")   Wt 72.1 kg (159 lb)   SpO2 97%   BMI 22.18 kg/m²   Smoking Status Former   BSA 1.9 m²      Physical Exam  Vitals reviewed.   Constitutional:       Appearance: Normal appearance.   HENT:      Head: Normocephalic and atraumatic.      Right Ear: Decreased hearing noted.      Left Ear: Decreased hearing noted.      Nose: Nose normal.      Mouth/Throat:      Lips: Pink.      Mouth: Mucous membranes are moist.   Eyes:      General: Vision grossly intact.   Cardiovascular:      Rate and Rhythm: Normal rate and regular rhythm.      Comments: No resting tachycardia.  Pulmonary:      Effort: Pulmonary effort is normal.      Breath sounds: Normal breath sounds.   Abdominal:      General: Bowel sounds are normal.      Palpations: Abdomen is soft.      Tenderness: There is abdominal tenderness in the suprapubic area. There is no right CVA tenderness or left CVA tenderness.      Comments: Mild suprapubic tenderness and palpable bladder.    Genitourinary:     Penis: Normal.       Testes: Normal.      Comments: Bladder scan 277 mL. Patient requesting gan catheter.   Musculoskeletal:      Cervical back: Normal range of motion and neck supple.      Comments: Moves all extremities randomly. Neurovascularly intact.    Lymphadenopathy:      Lower Body: No right inguinal adenopathy. No left inguinal adenopathy.   Skin:     General: Skin is warm and dry.      Capillary Refill: Capillary refill takes less than 2 seconds.   Neurological:      Mental Status: He is alert and oriented to person, place, and time.      Sensory: Sensation is intact.      Motor: Motor function is intact.      Coordination: Coordination is intact.      Gait: Gait is intact. "   Psychiatric:         Behavior: Behavior is cooperative.         No orders to display       Labs Reviewed   URINALYSIS WITH REFLEX CULTURE AND MICROSCOPIC - Abnormal       Result Value    Color, Urine Yellow      Appearance, Urine Clear      Specific Gravity, Urine 1.017      pH, Urine 5.0      Protein, Urine 30 (1+) (*)     Glucose, Urine NEGATIVE      Blood, Urine MODERATE (2+) (*)     Ketones, Urine 5 (TRACE) (*)     Bilirubin, Urine NEGATIVE      Urobilinogen, Urine 2.0 (*)     Nitrite, Urine NEGATIVE      Leukocyte Esterase, Urine NEGATIVE     URINALYSIS MICROSCOPIC WITH REFLEX CULTURE - Normal    WBC, Urine 1-5      RBC, Urine 3-5     URINALYSIS WITH REFLEX CULTURE AND MICROSCOPIC    Narrative:     The following orders were created for panel order Urinalysis with Reflex Culture and Microscopic.  Procedure                               Abnormality         Status                     ---------                               -----------         ------                     Urinalysis with Reflex C...[[id number]]  Abnormal            Final result               Extra Urine Gray Tube[[id number]]                            In process                   Please view results for these tests on the individual orders.   EXTRA URINE GRAY TUBE         ED Course & MDM   Diagnoses as of 04/01/24 2050   Suprapubic pressure   Difficulty urinating           Medical Decision Making  Patient presents the emergency department for a developing sensation consistent with previous episodes of acute urinary retention.  Patient states he has had this multiple times in the past and he has learned not to wait until he is full on acute retention when he knows there will be a long wait in the waiting room of the emergency department.  He had a bladder scan of 277 in which she was given the option to monitor his symptoms longer or have a catheter placed.  He prefers the latter.  He had resolve of his discomfort and resolve of his palpable bladder  after easy insertion of a new 18 Icelandic coudé catheter.  Urinalysis does not reflect an infection.  Clinical exam does not reflect need for evaluation for renal insufficiency, electrolyte dyscrasia or CT imaging as he has no indication of acute abdomen as the abdomen is soft, nontender and with active bowel sounds.  Patient evaluated by Dr. Marroquin who agrees with no further diagnostics, discharge and follow up with urology.    Plan is for gan catheter care with leg bag as applied by nursing staff to the patient's specifications. No empiric ABX while pending cx result. Patient is non-toxic, not hypoxic and appropriate for this outpatient management plan which they prefer. Encouragement to arrange close follow up was discussed as well as provided in a written handout of discharge instructions. Patient was educated on signs of symptoms to watch for indicative of re-evaluation in the emergency department setting to include any worsening of current symptoms. They verbalized understanding of instructions and is amenable to this treatment plan with no social determinants of health that would obscure this plan. Patient departed ambulatory in stable condition with this son.            Your medication list        ASK your doctor about these medications        Instructions Last Dose Given Next Dose Due   levothyroxine 112 mcg tablet  Commonly known as: Synthroid, Levoxyl      Take 1 tablet (112 mcg) by mouth once daily.       naloxone 4 mg/0.1 mL nasal spray  Commonly known as: Narcan           oxyCODONE-acetaminophen 5-325 mg tablet  Commonly known as: Percocet      Take 1 tablet by mouth once daily.       pravastatin 40 mg tablet  Commonly known as: Pravachol      Take 1 tablet (40 mg) by mouth once daily at bedtime.       tamsulosin 0.4 mg 24 hr capsule  Commonly known as: Flomax           vardenafil 20 mg tablet  Commonly known as: Levitra                    Procedure  Time: 2010    GAN CATHETER  INSERTION    Indication: patient request for developing urinary retention  Performed By: Senia Torres CNP  Risks, benefits and alternatives for the applicable procedure described. Opportunity for questions and answers provided to allow for informed decision making.  Consent: Verbal consent was obtained by the patient    Local Anesthesia:  Not required  Procedure:  A 18 Faroese sized coude Pepe catheter with a 10 mL balloon was passed without difficulty.  Complications:  None. Patient tolerated the procedure well.  Urology Consult Placed:  No.       *This report was transcribed using voice recognition software.  Every effort was made to ensure accuracy; however, inadvertent computerized transcription errors may be present.*  SUSHIL Talbot-YOKO  04/01/24         SUSHIL Talbot-YOKO  04/01/24 2050

## 2024-04-03 DIAGNOSIS — G89.29 OTHER CHRONIC BACK PAIN: ICD-10-CM

## 2024-04-03 DIAGNOSIS — M54.9 OTHER CHRONIC BACK PAIN: ICD-10-CM

## 2024-04-03 RX ORDER — OXYCODONE AND ACETAMINOPHEN 5; 325 MG/1; MG/1
1 TABLET ORAL DAILY
Qty: 30 TABLET | Refills: 0 | Status: SHIPPED | OUTPATIENT
Start: 2024-04-03 | End: 2024-06-04 | Stop reason: SDUPTHER

## 2024-04-03 NOTE — TELEPHONE ENCOUNTER
Med Refill   oxyCODONE-acetaminophen (Percocet) 5-325 mg tablet [[id number]]     [facility] [place] [place] [facility] [address] [id number] [facility] [address]  Phone: [phone]  Fax: [phone]

## 2024-04-05 ENCOUNTER — HOSPITAL ENCOUNTER (EMERGENCY)
Facility: HOSPITAL | Age: 89
Discharge: HOME | End: 2024-04-05
Payer: MEDICARE

## 2024-04-05 VITALS
HEIGHT: 70 IN | WEIGHT: 150 LBS | OXYGEN SATURATION: 97 % | BODY MASS INDEX: 21.47 KG/M2 | SYSTOLIC BLOOD PRESSURE: 130 MMHG | TEMPERATURE: 98.6 F | RESPIRATION RATE: 20 BRPM | HEART RATE: 74 BPM | DIASTOLIC BLOOD PRESSURE: 78 MMHG

## 2024-04-05 DIAGNOSIS — R31.9 HEMATURIA, UNSPECIFIED TYPE: Primary | ICD-10-CM

## 2024-04-05 LAB
APPEARANCE UR: ABNORMAL
BILIRUB UR STRIP.AUTO-MCNC: NEGATIVE MG/DL
COLOR UR: ABNORMAL
GLUCOSE UR STRIP.AUTO-MCNC: NEGATIVE MG/DL
HOLD SPECIMEN: NORMAL
KETONES UR STRIP.AUTO-MCNC: NEGATIVE MG/DL
LEUKOCYTE ESTERASE UR QL STRIP.AUTO: ABNORMAL
NITRITE UR QL STRIP.AUTO: NEGATIVE
PH UR STRIP.AUTO: 5 [PH]
PROT UR STRIP.AUTO-MCNC: ABNORMAL MG/DL
RBC # UR STRIP.AUTO: ABNORMAL /UL
RBC #/AREA URNS AUTO: >20 /HPF
SP GR UR STRIP.AUTO: 1.01
UROBILINOGEN UR STRIP.AUTO-MCNC: <2 MG/DL
WBC #/AREA URNS AUTO: ABNORMAL /HPF

## 2024-04-05 PROCEDURE — 87086 URINE CULTURE/COLONY COUNT: CPT | Mod: PORLAB | Performed by: NURSE PRACTITIONER

## 2024-04-05 PROCEDURE — 99283 EMERGENCY DEPT VISIT LOW MDM: CPT

## 2024-04-05 PROCEDURE — 81001 URINALYSIS AUTO W/SCOPE: CPT | Performed by: NURSE PRACTITIONER

## 2024-04-05 ASSESSMENT — COLUMBIA-SUICIDE SEVERITY RATING SCALE - C-SSRS
1. IN THE PAST MONTH, HAVE YOU WISHED YOU WERE DEAD OR WISHED YOU COULD GO TO SLEEP AND NOT WAKE UP?: NO
2. HAVE YOU ACTUALLY HAD ANY THOUGHTS OF KILLING YOURSELF?: NO
6. HAVE YOU EVER DONE ANYTHING, STARTED TO DO ANYTHING, OR PREPARED TO DO ANYTHING TO END YOUR LIFE?: NO

## 2024-04-05 ASSESSMENT — LIFESTYLE VARIABLES
TOTAL SCORE: 0
EVER HAD A DRINK FIRST THING IN THE MORNING TO STEADY YOUR NERVES TO GET RID OF A HANGOVER: NO
HAVE YOU EVER FELT YOU SHOULD CUT DOWN ON YOUR DRINKING: NO
HAVE PEOPLE ANNOYED YOU BY CRITICIZING YOUR DRINKING: NO
EVER FELT BAD OR GUILTY ABOUT YOUR DRINKING: NO

## 2024-04-05 ASSESSMENT — PAIN SCALES - GENERAL: PAINLEVEL_OUTOF10: 0 - NO PAIN

## 2024-04-05 ASSESSMENT — PAIN - FUNCTIONAL ASSESSMENT: PAIN_FUNCTIONAL_ASSESSMENT: 0-10

## 2024-04-05 NOTE — ED PROVIDER NOTES
Chief Complaint   Patient presents with   • blood in gan     Blood in gan since yesterday.  Has no  pain.  Frequent catheter issues       HPI       88 year old male presents to the Emergency Department today complaining of a 2 day history of blood in his leg bag. Notes that he had a catheter reinserted on 4/1/24 secondary to urinary retention. Denies any associated fever, chills, headache, neck pain, chest pain, shortness of breath, abdominal pain, flank pain, nausea, vomiting, diarrhea, constipation, or other urinary symptoms.       History provided by:  Patient             Patient History   Past Medical History:   Diagnosis Date   • Epididymo-orchitis 02/13/2020    Epididymoorchitis   • Impaired fasting glucose     Impaired fasting blood sugar   • Other shoulder lesions, right shoulder 02/19/2021    Rotator cuff tendonitis, right   • Pain, unspecified 11/01/2019    Pain   • Personal history of other diseases of male genital organs 01/21/2022    History of benign prostatic hyperplasia   • Personal history of other diseases of male genital organs     History of prostatitis   • Personal history of other diseases of the musculoskeletal system and connective tissue     History of arthritis   • Personal history of other diseases of the musculoskeletal system and connective tissue     History of chronic back pain   • Personal history of other diseases of the nervous system and sense organs 05/02/2022    History of hearing loss   • Personal history of other diseases of the nervous system and sense organs     History of presbycusis   • Personal history of other diseases of the respiratory system     History of chronic obstructive lung disease   • Personal history of other diseases of urinary system 05/02/2022    History of hematuria   • Personal history of other diseases of urinary system 05/02/2022    History of blood in urine   • Personal history of other endocrine, nutritional and metabolic disease     H/O  hyperlipidemia   • Personal history of other endocrine, nutritional and metabolic disease     History of hypothyroidism   • Personal history of other specified conditions 01/21/2022    History of dysuria   • Personal history of other specified conditions 01/21/2022    History of urinary retention   • Personal history of urinary (tract) infections 08/19/2020    History of urinary tract infection     Past Surgical History:   Procedure Laterality Date   • OTHER SURGICAL HISTORY  06/17/2019    No history of surgery     Family History   Family history unknown: Yes     Social History     Tobacco Use   • Smoking status: Former     Types: Cigarettes   • Smokeless tobacco: Never   Vaping Use   • Vaping Use: Never used   Substance Use Topics   • Alcohol use: Yes   • Drug use: Never           Physical Exam  Constitutional:       General: He is awake.      Appearance: Normal appearance.   Cardiovascular:      Rate and Rhythm: Normal rate and regular rhythm.      Pulses:           Radial pulses are 3+ on the right side and 3+ on the left side.      Heart sounds: Normal heart sounds. No murmur heard.     No friction rub. No gallop.   Pulmonary:      Effort: Pulmonary effort is normal.      Breath sounds: Normal breath sounds and air entry.   Abdominal:      General: Abdomen is flat.      Palpations: Abdomen is soft.      Tenderness: There is no abdominal tenderness. There is no right CVA tenderness or left CVA tenderness.      Comments: Hematuria noted in leg bag.    Neurological:      Mental Status: He is alert.   Psychiatric:         Behavior: Behavior is cooperative.         Labs Reviewed   URINALYSIS WITH REFLEX CULTURE AND MICROSCOPIC - Abnormal       Result Value    Color, Urine Red (*)     Appearance, Urine Hazy (*)     Specific Gravity, Urine 1.013      pH, Urine 5.0      Protein, Urine 30 (1+) (*)     Glucose, Urine NEGATIVE      Blood, Urine MODERATE (2+) (*)     Ketones, Urine NEGATIVE      Bilirubin, Urine NEGATIVE       Urobilinogen, Urine <2.0      Nitrite, Urine NEGATIVE      Leukocyte Esterase, Urine TRACE (*)    MICROSCOPIC ONLY, URINE - Abnormal    WBC, Urine 11-20 (*)     RBC, Urine >20 (*)    URINE CULTURE   URINALYSIS WITH REFLEX CULTURE AND MICROSCOPIC    Narrative:     The following orders were created for panel order Urinalysis with Reflex Culture and Microscopic.  Procedure                               Abnormality         Status                     ---------                               -----------         ------                     Urinalysis with Reflex C...[[id number]]  Abnormal            Final result               Extra Urine Gray Tube[[id number]]                            In process                   Please view results for these tests on the individual orders.   EXTRA URINE GRAY TUBE       No orders to display            ED Course & MDM   Diagnoses as of 04/05/24 1030   Hematuria, unspecified type           Medical Decision Making  Patient was seen and evaluated by myself. Urinalysis was positive for blood and trace leukocytes and some WBC's. Urine was sent for C & S which remains pending. Will hold off on treatment unless culture dictates need. Catheter is flowing without difficulty. Repeat abdominal evaluation reveals an abdomen soft, nondistended, and nontender to palpation. There was no rebound, rigidity, or guarding noted. There were no peritoneal signs noted. Continued to have multiple benign serial abdominal exams. At this time, we find no underlying evidence of acute pancreatitis, cholecystitis, cholangitis, diverticulitis, or appendicitis. No suprapubic pain or distention noted. Continues to not have any flank pain. Therefore, I do not feel that a CT scan is clinically indicated. He will likely need a cystoscope fr further evaluation of the hematuria. Follow up with Dr. Bae next week as planned. Return if worse in any way. Discharged in stable condition with computer instructions.    Diagnostic  Impression:      1. Acute hematuria           Your medication list        ASK your doctor about these medications        Instructions Last Dose Given Next Dose Due   levothyroxine 112 mcg tablet  Commonly known as: Synthroid, Levoxyl      Take 1 tablet (112 mcg) by mouth once daily.       naloxone 4 mg/0.1 mL nasal spray  Commonly known as: Narcan           oxyCODONE-acetaminophen 5-325 mg tablet  Commonly known as: Percocet      Take 1 tablet by mouth once daily.       pravastatin 40 mg tablet  Commonly known as: Pravachol      Take 1 tablet (40 mg) by mouth once daily at bedtime.       tamsulosin 0.4 mg 24 hr capsule  Commonly known as: Flomax           vardenafil 20 mg tablet  Commonly known as: Levitra                      Procedure  Procedures     Oswaldo Ovalles, SUSHIL-CNP  04/05/24 1038

## 2024-04-06 LAB — BACTERIA UR CULT: NO GROWTH

## 2024-04-08 ENCOUNTER — TELEPHONE (OUTPATIENT)
Dept: PRIMARY CARE | Facility: CLINIC | Age: 89
End: 2024-04-08

## 2024-04-08 ENCOUNTER — OFFICE VISIT (OUTPATIENT)
Dept: UROLOGY | Facility: CLINIC | Age: 89
End: 2024-04-08
Payer: MEDICARE

## 2024-04-08 ENCOUNTER — APPOINTMENT (OUTPATIENT)
Dept: UROLOGY | Facility: CLINIC | Age: 89
End: 2024-04-08
Payer: MEDICARE

## 2024-04-08 DIAGNOSIS — R33.9 URINARY RETENTION: ICD-10-CM

## 2024-04-08 DIAGNOSIS — N40.1 BENIGN PROSTATIC HYPERPLASIA WITH LOWER URINARY TRACT SYMPTOMS, SYMPTOM DETAILS UNSPECIFIED: Primary | ICD-10-CM

## 2024-04-08 PROCEDURE — 1160F RVW MEDS BY RX/DR IN RCRD: CPT | Performed by: UROLOGY

## 2024-04-08 PROCEDURE — 1159F MED LIST DOCD IN RCRD: CPT | Performed by: UROLOGY

## 2024-04-08 PROCEDURE — 99214 OFFICE O/P EST MOD 30 MIN: CPT | Performed by: UROLOGY

## 2024-04-08 NOTE — PROGRESS NOTES
04/08/2024  Patient failed voiding trial and request to have PVP again    Exam: Catheter in place, urine clear yellow    We discussed PVP greenlight laser prostatectomy, indication risk-benefit and alternative  All the questions were answered, the patient expressed understanding and agreed to the plan.    Impression  Urinary retention, failed voiding trial  Unstable bladder  BPH, status post PVP  Bladder spasms   BPH  Nocturia  Elevated PSA, negative biopsy     Plan  PVP greenlight laser prostatectomy  Postop appointment in 1 week    Chief Complaint   Patient presents with    Benign Prostatic Hypertrophy     Patient here today for an ER follow up due to BPH.  Patient states that his urine bag was completely full of blood, x 2 bags.  Patient currently has catheter placed by the hospital.     Last Visit Plan:  Increase liquid intake  Watch voiding, call 2 PM this afternoon          Physical Exam     TODAYS LAB RESULTS:    Unable to leave urine sample.     ASSESSMENT&PLAN:      IMPRESSIONS:     03/29/2024  No voiding well subjectively, PVR 53 ml     We discussed the benign prostate hypertrophy, status post show urinary retention voiding trial continue  All the questions were answered, the patient expressed understanding and agreed to the plan.     Impression  Unstable bladder  BPH“status post PVP  Bladder spasms   BPH  Nocturia  Elevated PSA, negative biopsy     Plan  Increase liquid intake  Watch voiding, call 2 PM this afternoon             Chief Complaint   Patient presents with    Benign Prostatic Hypertrophy       Patient had TOV yesterday. Nocturia 4-5x last night. He does not feel like he is emptying his bladder, however, PVR was low. He is drinking 3-4 cups of coffee each morning.          Physical Exam      TODAYS LAB RESULTS:     PVR 53 mL     POC Glucose, Urine  NEGATIVE mg/dl NEGATIVE   POC Bilirubin, Urine  NEGATIVE NEGATIVE   POC Ketones, Urine  NEGATIVE mg/dl NEGATIVE   POC Specific Gravity, Urine  1.005  - 1.035 1.025   POC Blood, Urine  NEGATIVE LARGE (3+) Abnormal    POC PH, Urine  No Reference Range Established PH 5.5   POC Protein, Urine  NEGATIVE, 30 (1+) mg/dl 100 (2+) Abnormal    POC Urobilinogen, Urine  0.2, 1.0 EU/DL 1.0   Poc Nitrite, Urine  NEGATIVE NEGATIVE   POC Leukocytes, Urine  NEGATIVE NEGATIVE      ASSESSMENT&PLAN:        IMPRESSIONS:       03/28/2024  Patient developed urinary retention, status post the PVP     Exam: Pepe catheter in place, urine clear yellow     Pepe catheter was removed without difficulty     We discussed benign prostate hypertrophy, status post PVP, recurrent urinary we discussed the voiding trial retention  Continue Flomax 0.8 mg daily  All the questions were answered, the patient expressed understanding and agreed to the plan.     Impression  Unstable bladder  BPH“status post PVP  Bladder spasms   BPH  Nocturia  Elevated PSA, negative biopsy     Plan  Voiding trial today,  Keep yearly follow-up appointment  Possible repeat PVP in the future     Patient last saw Dr. Small 05/11/2023.          Chief Complaint   Patient presents with    Benign Prostatic Hypertrophy       Patient here for ER follow-up. He presented to the ER 03/25/2024 with urinary retention. Pepe catheter was placed. UA showed no signs of infection at ER. He is currently prescribed Tamsulosin 0.8 mg daily per Dr. Small. He has a hx of urinary obstruction associated with BPH s/p PVP done 05/10/2022.         Physical Exam      TODAYS LAB RESULTS:        ASSESSMENT&PLAN:        IMPRESSIONS:        5/11/2023 HBM  87-year-old male with a history of urinary retention his tamsulosin was increased from 0.4 to 0.8 mg daily patient states that he has a slightly stronger urinary stream he is still having some urinary frequency and nocturia x3 he has to wait after voiding and then occasionally he will start voiding again his postvoid residual today was 70 mL and his urinalysis was unremarkable. He is to continue on  tamsulosin 0.8 mg daily and follow-up visit here in 6 months. Patient denies any side effects from the increased dose of tamsulosin to 0.8 he denies dizziness lightheadedness.   05/26/2022  Voiding okay, some frequency and nocturia     Patient has no nausea, no vomiting, no fever.     Patient here today for 1wk PVR following voiding trial done 5/19/22. Patient is status post PVP done 5/10/22.   Patient states he does have a lot of incontinence. He does have nocturia every hour and is wondering if there is something to help with this.He is still taking the tamsulosin 0.8mg daily. PVR 47ml  He did not leave urine sample today.     We discussed benign prostate hypertrophy, status post of PVP, unstable bladder symptom  All the questions were answered, the patient expressed understanding and agreed to the plan.     Impression  Unstable bladder  BPHâ€“status post PVP  Bladder spasms   BPH  Nocturia  Elevated PSA, negative biopsy     Plan  Time voiding, bladder training  Wean off Flomax 1 at a time  Appointment in 3 months        05/19/2022 addendum  Patient voids with slow stream, no blood     PVR 90 cc     Continue voiding trial, call back tomorrow morning        05/19/2022  Did well after PVP, some bladder spasm     Exam: Gan catheter in place, urine clear yellow     Catheter was removed without difficulty     We discussed the post PVP, voiding trial  All the questions were answered, the patient expressed understanding and agreed to the plan.     Impression  BPHâ€“status post PVP  Bladder spasms   BPH  Nocturia  Elevated PSA, negative biopsy     Plan  Voiding trial today, call back 3 PM  Appointment in 1 week for PVR           04/25/2022     Patient here today for 1 month follow up on CIC teaching. Patient was to do CIC TID, but failed and had issues with infection. He was inpatient 3/28-4/1/22 for retention and UTI- has gan catheter in place currently. He is scheduled for PVP on 5/10/22.   Patient had been having  issues with leakage around the catheter for the past few days. Last change done while patient was in hospital 3/28. Removed and replaced 18FF coude with 10cc balloon per Rodrick BOX. Patient prepped with iodine. Catheter draining clear yellow urine and able to be flushed appropriately. Patient tolerated well. Patient to follow up as scheduled after he has PVP done.      Once again we discussed the benign prostate hypertrophy, urinary retention, failed CIC, on schedule for PVP  All the questions were answered, the patient expressed understanding and agreed to the plan.     Impression  BPH  Bladder spasms   BPH  Nocturia  Elevated PSA, negative biopsy     Plan  Continue Pepe catheter  PVP greenlight laser prostatectomy  Appointment 1 week after           04/14/2022  Patient decided to proceed PVP greenlight laser prostatectomy     We discussed significant enlarged prostate, history of urinary retention  We discussed the cystoscopy and prostate ultrasound result  We discussed PVP greenlight laser prostatectomy, indication risk of benefit and alternative  All the questions were answered, the patient expressed understanding and agreed to the plan.     Impression  BPH  Bladder spasms   BPH  Nocturia  Elevated PSA, negative biopsy     Plan  Continue 0.8mg Flomax daily  PVP greenlight laser prostatectomy  Appointment 1 week after        03/24/2022  Patient declined PVP greenlight laser prostatectomy, would like to do CIC     Patient has no nausea, no vomiting, no fever.     Patient here today for follow up urinary retention. Patient c/o leaking around catheter with bowel movement,  [place] and would like to discuss self catheterization.      We discussed benign prostate hypertrophy, urinary retention,  We discussed the CIC, indication the risk of benefit and alternative, bleeding trauma etc.  All the questions were answered, the patient expressed understanding and agreed to the plan.      Impression  BPH  Bladder spasms   BPH  Nocturia  Elevated PSA, negative biopsy     Plan  CIC teaching today  CIC tid  Appointment in 1 month           02/24/2022  Patient voiding relatively okay     Cystoscopy     Cystoscopy was performed under local without difficulty:     Findings: Normal anterior urethra, significant enlarged prostate; significant trabeculated bladder; no tumor no stone     Pain evaluation: 0/10 before, 2/10 after.     Prostate ultrasound.     Prostate volume 134 cc     Pain evaluation: 0/10 before, 2/10 after.     We discussed significant enlarged prostate, history of urinary retention  We discussed the cystoscopy and prostate ultrasound result  We discussed PVP greenlight laser prostatectomy, indication risk of benefit and alternative  All the questions were answered, the patient expressed understanding and agreed to the plan.     Impression  BPH  Bladder spasms   BPH  Nocturia  Elevated PSA, negative biopsy     Plan  Continue 0.8mg Flomax daily   PVP greenlight laser prostatectomy   Continue monitoring elevated PSA              02/11/22:   Patient originally here today on nurse schedule for 4wk indwelling gan change. He would like to discuss possible voiding trial instead.      Patient takes 0.8mg Flomax daily.     Patient has no nausea, vomiting, or fever      Exam: Gan catheter was removed without difficulty. urine clear yellow     We discussed recurring urinary retention, voiding trial, increase liquid intake, BPH workup followed by possible PVP greenlight laser prostatectomy, risks, benefits, alternatives   We discussed benign prostatic hyperplasia with mild voiding symptoms, continue 0.8mg Flomax daily  We discussed history of elevated PSA, stable, cessation of PSA due to age  All the questions were answered, the patient expressed understanding and agreed to the plan.     Impression  Urinary retention   Bladder spasms   BPH  Nocturia  Elevated PSA, negative biopsy     Plan  Voiding  trial, ER if unable to void  Increase liquid intake  Continue 0.8mg Flomax daily  Keep Cystoscopy, prostate US  Possible PVP greenlight laser prostatectomy               01/21/22:   Patient here today for ED follow on 1/14/22 for urinary retention and gan cath was placed. Patient would like to review option for BPH. Patient inquires about self-catheterization. Failed voiding trial twice.     Patient takes 0.8mg Flomax daily.     Patient has no nausea, vomiting, or fever      We discussed recurring urinary retention, keep gan catheter in place, BPH workup followed by possible PVP greenlight laser prostatectomy, risks, benefits, alternatives   We discussed benign prostatic hyperplasia with mild voiding symptoms, continue 0.8mg Flomax daily  We discussed history of elevated PSA, stable, cessation of PSA due to age  All the questions were answered, the patient expressed understanding and agreed to the plan.     Impression  Urinary retention   Bladder spasms   BPH  Nocturia  Elevated PSA, negative biopsy     Plan  Keep Gan catheter in place  Continue 0.8mg Flomax daily  Cystoscopy, prostate US  Possible PVP greenlight laser prostatectomy            12/01/21:   Patient is here today to void trial. Patient went to ED on 11/26/21 Due to him not being able to void and a gan cath was put in. This has occurred 3x prior. He denies hematuria, besides after catheter was placed.      Patient takes 0.8mg Flomax daily.     Patient has no nausea, vomiting, or fever      Exam: Catheter removed without difficulty, urine clear yellow     We discussed recurring urinary retention, gan catheter, voiding trial, increase liquid intake, call at 3pm  We discussed benign prostatic hyperplasia with mild voiding symptoms, continue 0.8mg Flomax daily  We discussed history of elevated PSA, stable, cessation of PSA due to age  All the questions were answered, the patient expressed understanding and agreed to the plan.      Impression  Urinary retention   Bladder spasms   BPH  Nocturia  Elevated PSA, negative biopsy     Plan  Voiding trial, call at 3pm  Increase liquid intake   Continue 0.8mg Flomax daily   No more PSA  Keep yearly appointment           03/12/21  Voiding well, nocturia 1-2 on Flomax 0.8 mg daily     Patient has no nausea, no vomiting, no fever.     SUAD: Deferred     No more PSA     PVR: 120 cc     IO UA (automated w/o microscopy)           12Mar2021 10:52AM          Reji Bae     Test Name       Result     Flag        Reference  IO Glucose - Urine         Negative                  IO Bilirubin       Negative                  IO Ketones      Negative                  IO Specific Gravity         1.020                       IO Blood          Trace                       IO pH               7.0                           IO Protein, Urine            Negative                  IO Urobilinogen                                              Normal (0.2-1.0 mg/dl)  IO Nitrite, Urine              Negative                  IO Leukocytes Negative                  IO Glucose - Urine         Negative                  IO Bilirubin       Negative                  IO Ketones      Negative                  IO Specific Gravity         1.020                       IO Blood          Trace                       IO pH               7.0                           IO Protein, Urine            Negative                  IO Urobilinogen                                              Normal (0.2-1.0 mg/dl)  IO Nitrite, Urine              Negative                  IO Leukocytes Negative                                                            We discussed benign prostate hypertrophy, urinary retention, resolved, incomplete emptying  We discussed continue Flomax  All the questions were answered, the patient expressed understanding and agreed to the plan.     Impression  Urinary retention   Bladder spasms   BPH  Nocturia  Elevated PSA, negative biopsy      Plan  Continue Flomax 04 mg 2 tablets daily        Yearly SUAD and PSA        09/11/2020  Voiding well, stream current     Patient has no nausea, no vomiting, no fever.      cc     IO Ultrasound, measurement post-void resid urine and/or bl cap; no imag         [id number] 01:26PM          Reji Bae     Test Name       Result     Flag        Reference  IO Ultrasound, measurement post void resid urine and/or bl cap; non-imag       238 ml/min                                                                                                                    IO UA (automated w/o microscopy)           [id number] 01:18PM          Reji Bae     Test Name       Result     Flag        Reference  IO Glucose - Urine         Negative                  IO Bilirubin       Negative                  IO Ketones      Negative                  IO Specific Gravity         1.015                       IO Blood          (+)small - 15                          IO pH               6.0                           IO Protein, Urine            Negative                  IO Urobilinogen                                              Normal (0.2-1.0 mg/dl)  IO Nitrite, Urine              Negative                  IO Leukocytes Negative                  IO Glucose - Urine         Negative                  IO Bilirubin       Negative                  IO Ketones      Negative                  IO Specific Gravity         1.015                       IO Blood          (+)small - 15                          IO pH               6.0                           IO Protein, Urine            Negative                  IO Urobilinogen                                              Normal (0.2-1.0 mg/dl)  IO Nitrite, Urine              Negative                  IO Leukocytes Negative      We discussed benign prostate hypertrophy, urinary retention, resolved, incomplete emptying  We discussed continue Flomax  All the questions were answered, the patient expressed  "understanding and agreed to the plan.     Impression  Urinary retention   Bladder spasms   BPH  Nocturia  Elevated PSA, negative biopsy     Plan  Continue Flomax 04 mg 2 tablets daily        Appointment in 6 months           09/04/2020  Patient developed urinary retention and had a Pepe catheter in for 2 weeks, on doxazosin and oxybutynin     Patient has no nausea, no vomiting, no fever.     Exam: Pepe catheter in place, urine clear yellow     Pepe catheter was removed without difficulty        PSA 8/2/20 8.27. Stable     We discussed benign prostate hypertrophy, urinary retention, voiding trial, Flomax 2 tablets daily  All the questions were answered, the patient expressed understanding and agreed to the plan.     Impression  Urinary retention   Bladder spasms   BPH  Nocturia  Elevated PSA, negative biopsy     Plan  Voiding trial today, ER if unable to void  DC oxybutynin  DC doxazosin  Flomax 04 mg 2 tablets daily  Appointment in 1 week for PVR  Possible BPH workup     I spent 25 minutes with this patient. Greater than 50% of this time was spent in counseling and/or coordination of care        8/19/2020 Cindy Barrow CNP      85 year old male patient here today due to isues with catheter not draining correctly, especially when standing up. He was seen in the ER on 8/16/20 due urinary retention and Pepe catheter was placed at that time. He has failed void trials in the past. He was last seen in the office 9/6/19 per Dr. Bae for urinary retention, PVR was WNL at that time/ Patient states since a few hours after having catheter placed, he has been having discomfort around catheter insertion site. He doesn't think that the catheter is draining correctly. Sometimes he will feel the urge to urinate and \"push\" he will leakage around his catheter. He states that everything is fine when he is laying down and his catheter is draining appropriately. Flushed catheter without difficulty. Catheter secure is pulling on " catheter. Drainage bag also noted to be below his knee. Patient has erythema to inner right thigh due to catheter pulling his penis. Asked patient to not use the cath secure and to keep his drainage bag above his knee. I believe that he is having bladder spasms from pulling on the catheter. I will start an antibiotic due to possible UTI. Will make follow up appointment for void trial/to discuss PVP. All the questions were answered, the patient expressed understanding and agreed to the plan.     Impression  Urinary retention   Bladder spasms   BPH  Nocturia  Elevated PSA, negative biopsy     Plan  Continue Pepe catheter, avoid pulling on catheter, keep bag above knee  Start Keflex 500 mg BID #14   Void trial in 1-2 weeks   PSA overdue   Possible PVP in the future           I spent 25 minutes with this patient. Greater than 50% of this time was spent in counseling and/or coordination of care              09/06/2019  Patient voiding well during the day, but still nocturia 1-2 hours, on doxazosin 4 mg daily     Patient has no nausea, no vomiting, no fever.     PVR: Minimal 65 cc     We discussed benign prostate hypertrophy, nocturia, minimal PVR, conservative management versus PVP  All the questions were answered, the patient expressed understanding and agreed to the plan.     Impression  BPH  Nocturia  Elevated PSA, negative biopsy     Plan  Conservative management  Cutback liquid intake after 6 PM  Yearly SUAD and a PSA  Possible PVP in the future           08/29/2019  Patient here for post-prostate biopsy follow-up     Patient did well post biopsy     Pathology: No malignancy     Exam: Pepe catheter in place, clear yellow urine     We discussed pathology  We discussed benign prostate hypertrophy very large prostate, all Flomax, voiding trial one more time, possible PVP greenlight laser prostatectomy     Impression  Left epididymoorchitis, acute  BPH, intractable urinary retention  Elevated PSA     Plan  Voiding  trial today, call back 3 PM  Appointment in 1 week for PVR  Possible greenlight laser prostatectomy     I spent 25 minutes with this patient. Greater than 50% of this time was spent in counseling and/or coordination of care        07/19/2019  Transrectal ultrasound-guided prostate biopsy     Biopsy was performed under local without difficulty     A total 12 cores specimen obtained     Prostate volume 142 cc     Pain evaluation: 0/10 before, 2/10 after.     We discussed the post biopsy instructions, continue Pepe catheter insertion  All the questions were answered, the patient expressed understanding and agreed to the plan.     Impression  Left epididymoorchitis, acute  BPH, intractable urinary retention  Elevated PSA     Plan  Await pathology  Continue Pepe catheter indwelling  Possible PVP greenlight laser prostatectomy  Appointment in 2 weeks           06/24/2019  Patient failed voiding trial, Pepe catheter was inserted into ER, also complaining left scrotal pain.     Patient has no nausea, no vomiting, no fever.     Exam: Left testicle is 3 times enlarged, tender, right testicle normal     Pepe catheter, concentrated urine     Scrotal ultrasound  IMPRESSION:  1. The left epididymis appears hypervascular suggesting epididymitis. There is  also mild increase in vascularity of the left testicle which may be relate to  orchitis.     2. Moderate to large size complicated left-sided hydrocele with internal  septation, infections or hemorrhage is not excluded.     3. There is an indeterminate 5 x 4 x 4 mm cystic lesion in the right testicle.  Close continued surveillance is recommended.     4. Moderate-sized hydrocele with internal debris is present in the right  hemiscrotum.     Impression  Left epididymoorchitis, acute  BPH, intractable urinary retention  Elevated PSA     Plan  Bactrim DS 1 tablet twice day for 3 weeks  Prostate transrectal ultrasound-guided biopsy in 3 weeks  Cystoscopy later  Possible PVP  greenlight laser prostatectomy     I spent 25 minutes with this patient. Greater than 50% of this time was spent in counseling and/or coordination of care        06/17/2019  84-year-old white male developed acute onset of urinary retention, gan catheter was inserted in the emergency room a week ago. The catheter was working well     Patient has no nausea, no vomiting, no fever.     Exam: Gan catheter clear yellow, removed without difficulty     We discussed benign prostate hypertrophy, urinary retention, voiding trial, possible BPH workup in the future. All the questions were answered, the patient expressed understanding and agreed to the plan.     Impression  BPH  Acute urinary retention     Plan  Continue Flomax 0.4 mg daily  Voiding trial today callbacks 3 PM  Appointment in 1 week for PVR              PSA  8/2/20 8.27.  12/24/18 7.90     Surgery  5/10/2022 PVP greenlight laser prostatectomy  7/19/19 prostate biopsy negative

## 2024-04-08 NOTE — TELEPHONE ENCOUNTER
Patient will be having surgery with Dr. Bae on 4/30 and he needs surgical clearance before that date,

## 2024-04-11 ENCOUNTER — APPOINTMENT (OUTPATIENT)
Dept: UROLOGY | Facility: CLINIC | Age: 89
End: 2024-04-11
Payer: MEDICARE

## 2024-04-16 ENCOUNTER — ANESTHESIA EVENT (OUTPATIENT)
Dept: OPERATING ROOM | Facility: HOSPITAL | Age: 89
End: 2024-04-16
Payer: MEDICARE

## 2024-04-25 ENCOUNTER — OFFICE VISIT (OUTPATIENT)
Dept: PRIMARY CARE | Facility: CLINIC | Age: 89
End: 2024-04-25
Payer: MEDICARE

## 2024-04-25 ENCOUNTER — APPOINTMENT (OUTPATIENT)
Dept: LAB | Facility: LAB | Age: 89
End: 2024-04-25
Payer: MEDICARE

## 2024-04-25 VITALS
SYSTOLIC BLOOD PRESSURE: 114 MMHG | WEIGHT: 150 LBS | DIASTOLIC BLOOD PRESSURE: 60 MMHG | HEART RATE: 60 BPM | HEIGHT: 70 IN | BODY MASS INDEX: 21.47 KG/M2

## 2024-04-25 DIAGNOSIS — E11.69 DYSLIPIDEMIA ASSOCIATED WITH TYPE 2 DIABETES MELLITUS (MULTI): ICD-10-CM

## 2024-04-25 DIAGNOSIS — Z01.818 PREOPERATIVE EXAMINATION: Primary | ICD-10-CM

## 2024-04-25 DIAGNOSIS — E11.9 DIABETES MELLITUS TYPE 2, DIET-CONTROLLED (MULTI): ICD-10-CM

## 2024-04-25 DIAGNOSIS — E78.5 DYSLIPIDEMIA ASSOCIATED WITH TYPE 2 DIABETES MELLITUS (MULTI): ICD-10-CM

## 2024-04-25 DIAGNOSIS — E03.9 ACQUIRED HYPOTHYROIDISM: ICD-10-CM

## 2024-04-25 DIAGNOSIS — N40.1 URINARY RETENTION DUE TO BENIGN PROSTATIC HYPERPLASIA: ICD-10-CM

## 2024-04-25 DIAGNOSIS — R33.8 URINARY RETENTION DUE TO BENIGN PROSTATIC HYPERPLASIA: ICD-10-CM

## 2024-04-25 PROBLEM — H81.11 BENIGN POSITIONAL VERTIGO, RIGHT: Status: RESOLVED | Noted: 2023-08-10 | Resolved: 2024-04-25

## 2024-04-25 PROBLEM — R97.20 ELEVATED PSA: Status: RESOLVED | Noted: 2023-05-08 | Resolved: 2024-04-25

## 2024-04-25 PROBLEM — H61.21 HEARING LOSS OF RIGHT EAR DUE TO CERUMEN IMPACTION: Status: RESOLVED | Noted: 2023-08-10 | Resolved: 2024-04-25

## 2024-04-25 PROBLEM — Z23 FLU VACCINE NEED: Status: RESOLVED | Noted: 2023-11-14 | Resolved: 2024-04-25

## 2024-04-25 PROCEDURE — 1036F TOBACCO NON-USER: CPT | Performed by: INTERNAL MEDICINE

## 2024-04-25 PROCEDURE — 1159F MED LIST DOCD IN RCRD: CPT | Performed by: INTERNAL MEDICINE

## 2024-04-25 PROCEDURE — 1160F RVW MEDS BY RX/DR IN RCRD: CPT | Performed by: INTERNAL MEDICINE

## 2024-04-25 PROCEDURE — 99214 OFFICE O/P EST MOD 30 MIN: CPT | Performed by: INTERNAL MEDICINE

## 2024-04-25 PROCEDURE — 93000 ELECTROCARDIOGRAM COMPLETE: CPT | Performed by: INTERNAL MEDICINE

## 2024-04-25 NOTE — H&P (VIEW-ONLY)
Subjective   Dale Mcmahan is a 88 y.o. male who presents to the office today for a preoperative consultation at the request of surgeon  who plans on performing PVP green ligh Laser prostatectomy  on April 30. This consultation is requested for the specific conditions prompting preoperative evaluation (i.e. because of potential effect on operative risk): low. Planned anesthesia: general. The patient has the following known anesthesia issues:  none . Patients bleeding risk: no recent abnormal bleeding. Patient does not have objections to receiving blood products if needed.    Review of Systems   All other systems reviewed and are negative.      Objective   Physical Exam  Vitals and nursing note reviewed.   Constitutional:       General: He is not in acute distress.     Appearance: Normal appearance. He is well-developed. He is not toxic-appearing.   HENT:      Head: Normocephalic and atraumatic.      Right Ear: Tympanic membrane and external ear normal.      Left Ear: Tympanic membrane and external ear normal.      Nose: Nose normal.      Mouth/Throat:      Mouth: Mucous membranes are moist.      Pharynx: Oropharynx is clear. No oropharyngeal exudate or posterior oropharyngeal erythema.      Tonsils: No tonsillar exudate. 2+ on the right. 2+ on the left.   Eyes:      Extraocular Movements: Extraocular movements intact.      Conjunctiva/sclera: Conjunctivae normal.   Cardiovascular:      Rate and Rhythm: Normal rate and regular rhythm.      Pulses: Normal pulses.      Heart sounds: Normal heart sounds. No murmur heard.  Pulmonary:      Effort: Pulmonary effort is normal.      Breath sounds: Normal breath sounds.   Abdominal:      General: Abdomen is flat. Bowel sounds are normal.      Palpations: Abdomen is soft.   Musculoskeletal:      Cervical back: Neck supple.   Feet:      Right foot:      Skin integrity: Skin integrity normal. No ulcer, blister, skin breakdown, erythema, warmth or callus.      Toenail  Condition: Right toenails are normal.      Left foot:      Skin integrity: Skin integrity normal. No ulcer, blister, skin breakdown, erythema, warmth or callus.      Toenail Condition: Left toenails are normal.   Lymphadenopathy:      Cervical: No cervical adenopathy.   Skin:     General: Skin is warm and dry.      Capillary Refill: Capillary refill takes more than 3 seconds.      Findings: No rash.   Neurological:      Mental Status: He is alert. Mental status is at baseline.      Sensory: Sensation is intact.   Psychiatric:         Mood and Affect: Mood normal.         Behavior: Behavior normal.         Thought Content: Thought content normal.         Judgment: Judgment normal.          Predictors of intubation difficulty:   Morbid obesity? no   Anatomically abnormal facies? no   Prominent incisors? no   Receding mandible? no   Short, thick neck? no   Neck range of motion: normal   Mallampati score: I (soft palate, uvula, fauces, and tonsillar pillars visible)   Thyromental distance: < 6cm   Mouth openincm   Dentition: No chipped, loose, or missing teeth.    Cardiographics  ECG: no change since previous ECG dated 24  Echocardiogram: not done    Imaging  Chest x-ray: normal     Lab Review   not applicable.    Assessment/Plan   88 y.o. male with planned surgery as above.    Known risk factors for perioperative complications: None    Difficulty with intubation is not anticipated.    Cardiac Risk Estimation: low    Current medications which may produce withdrawal symptoms if withheld perioperatively: none     1. Preoperative workup as follows none.  2. Change in medication regimen before surgery: none, continue medication regimen including morning of surgery, with sip of water.  3. Prophylaxis for cardiac events with perioperative beta-blockers: not indicated.  4. Invasive hemodynamic monitoring perioperatively: not indicated.  5. Deep vein thrombosis prophylaxis postoperatively: n .  6. Surveillance for  postoperative MI with ECG immediately postoperatively and on postoperative days 1 and 2 AND troponin levels 24 hours postoperatively and on day 4 or hospital discharge (whichever comes first): not indicated.  7. Other measures:  Medically cleared to proceed with laser prostatectomy at this time okay to hold medications prior to surgery    Dale was seen today for pre-op exam.  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

## 2024-04-25 NOTE — PROGRESS NOTES
"Subjective   Patient ID: Dale Mcmahan is a 88 y.o. male who presents for Pre-op Exam (Scheduled 04/30/2024 PVP green Light Laser Prostatectomy with Dr. Reji Bae).    HPI     Review of Systems    Objective   /60 (BP Location: Left arm, Patient Position: Sitting)   Pulse 60   Ht 1.778 m (5' 10\")   Wt 68 kg (150 lb)   BMI 21.52 kg/m²     Physical Exam    Assessment/Plan          "

## 2024-04-26 ENCOUNTER — LAB (OUTPATIENT)
Dept: LAB | Facility: LAB | Age: 89
End: 2024-04-26
Payer: MEDICARE

## 2024-04-26 DIAGNOSIS — M54.9 OTHER CHRONIC BACK PAIN: ICD-10-CM

## 2024-04-26 DIAGNOSIS — E03.9 ACQUIRED HYPOTHYROIDISM: ICD-10-CM

## 2024-04-26 DIAGNOSIS — Z23 FLU VACCINE NEED: ICD-10-CM

## 2024-04-26 DIAGNOSIS — E78.5 DYSLIPIDEMIA, GOAL LDL BELOW 100: ICD-10-CM

## 2024-04-26 DIAGNOSIS — Z01.818 PREOPERATIVE EXAMINATION: ICD-10-CM

## 2024-04-26 DIAGNOSIS — G89.29 OTHER CHRONIC BACK PAIN: ICD-10-CM

## 2024-04-26 DIAGNOSIS — E11.9 DIABETES MELLITUS TYPE 2, DIET-CONTROLLED (MULTI): ICD-10-CM

## 2024-04-26 LAB
ALBUMIN SERPL BCP-MCNC: 4 G/DL (ref 3.4–5)
ALP SERPL-CCNC: 81 U/L (ref 33–136)
ALT SERPL W P-5'-P-CCNC: 22 U/L (ref 10–52)
ANION GAP SERPL CALC-SCNC: 12 MMOL/L (ref 10–20)
AST SERPL W P-5'-P-CCNC: 21 U/L (ref 9–39)
BASOPHILS # BLD AUTO: 0.11 X10*3/UL (ref 0–0.1)
BASOPHILS NFR BLD AUTO: 1.4 %
BILIRUB SERPL-MCNC: 0.8 MG/DL (ref 0–1.2)
BUN SERPL-MCNC: 18 MG/DL (ref 6–23)
CALCIUM SERPL-MCNC: 9 MG/DL (ref 8.6–10.3)
CHLORIDE SERPL-SCNC: 103 MMOL/L (ref 98–107)
CHOLEST SERPL-MCNC: 182 MG/DL (ref 0–199)
CHOLESTEROL/HDL RATIO: 5.3
CO2 SERPL-SCNC: 27 MMOL/L (ref 21–32)
CREAT SERPL-MCNC: 1.13 MG/DL (ref 0.5–1.3)
EGFRCR SERPLBLD CKD-EPI 2021: 63 ML/MIN/1.73M*2
EOSINOPHIL # BLD AUTO: 0.25 X10*3/UL (ref 0–0.4)
EOSINOPHIL NFR BLD AUTO: 3.1 %
ERYTHROCYTE [DISTWIDTH] IN BLOOD BY AUTOMATED COUNT: 12.4 % (ref 11.5–14.5)
EST. AVERAGE GLUCOSE BLD GHB EST-MCNC: 148 MG/DL
GLUCOSE SERPL-MCNC: 102 MG/DL (ref 74–99)
HBA1C MFR BLD: 6.8 %
HCT VFR BLD AUTO: 47.2 % (ref 41–52)
HDLC SERPL-MCNC: 34.2 MG/DL
HGB BLD-MCNC: 15.4 G/DL (ref 13.5–17.5)
IMM GRANULOCYTES # BLD AUTO: 0.04 X10*3/UL (ref 0–0.5)
IMM GRANULOCYTES NFR BLD AUTO: 0.5 % (ref 0–0.9)
LDLC SERPL CALC-MCNC: 111 MG/DL
LYMPHOCYTES # BLD AUTO: 1.49 X10*3/UL (ref 0.8–3)
LYMPHOCYTES NFR BLD AUTO: 18.6 %
MCH RBC QN AUTO: 29.3 PG (ref 26–34)
MCHC RBC AUTO-ENTMCNC: 32.6 G/DL (ref 32–36)
MCV RBC AUTO: 90 FL (ref 80–100)
MONOCYTES # BLD AUTO: 0.57 X10*3/UL (ref 0.05–0.8)
MONOCYTES NFR BLD AUTO: 7.1 %
NEUTROPHILS # BLD AUTO: 5.53 X10*3/UL (ref 1.6–5.5)
NEUTROPHILS NFR BLD AUTO: 69.3 %
NON HDL CHOLESTEROL: 148 MG/DL (ref 0–149)
NRBC BLD-RTO: 0 /100 WBCS (ref 0–0)
PLATELET # BLD AUTO: 332 X10*3/UL (ref 150–450)
POTASSIUM SERPL-SCNC: 4.4 MMOL/L (ref 3.5–5.3)
PROT SERPL-MCNC: 7.7 G/DL (ref 6.4–8.2)
RBC # BLD AUTO: 5.26 X10*6/UL (ref 4.5–5.9)
SODIUM SERPL-SCNC: 138 MMOL/L (ref 136–145)
TRIGL SERPL-MCNC: 186 MG/DL (ref 0–149)
TSH SERPL-ACNC: 3.15 MIU/L (ref 0.44–3.98)
VLDL: 37 MG/DL (ref 0–40)
WBC # BLD AUTO: 8 X10*3/UL (ref 4.4–11.3)

## 2024-04-26 PROCEDURE — 80061 LIPID PANEL: CPT

## 2024-04-26 PROCEDURE — 85025 COMPLETE CBC W/AUTO DIFF WBC: CPT

## 2024-04-26 PROCEDURE — 36415 COLL VENOUS BLD VENIPUNCTURE: CPT

## 2024-04-26 PROCEDURE — 84443 ASSAY THYROID STIM HORMONE: CPT

## 2024-04-26 PROCEDURE — 83036 HEMOGLOBIN GLYCOSYLATED A1C: CPT

## 2024-04-26 PROCEDURE — 80053 COMPREHEN METABOLIC PANEL: CPT

## 2024-04-30 ENCOUNTER — HOSPITAL ENCOUNTER (OUTPATIENT)
Facility: HOSPITAL | Age: 89
Setting detail: OUTPATIENT SURGERY
Discharge: HOME | End: 2024-04-30
Attending: UROLOGY | Admitting: UROLOGY
Payer: MEDICARE

## 2024-04-30 ENCOUNTER — ANESTHESIA (OUTPATIENT)
Dept: OPERATING ROOM | Facility: HOSPITAL | Age: 89
End: 2024-04-30
Payer: MEDICARE

## 2024-04-30 VITALS
HEIGHT: 70 IN | RESPIRATION RATE: 15 BRPM | SYSTOLIC BLOOD PRESSURE: 152 MMHG | HEART RATE: 61 BPM | WEIGHT: 150 LBS | DIASTOLIC BLOOD PRESSURE: 90 MMHG | OXYGEN SATURATION: 95 % | BODY MASS INDEX: 21.47 KG/M2 | TEMPERATURE: 97.5 F

## 2024-04-30 DIAGNOSIS — N40.1 BENIGN PROSTATIC HYPERPLASIA WITH LOWER URINARY TRACT SYMPTOMS, SYMPTOM DETAILS UNSPECIFIED: Primary | ICD-10-CM

## 2024-04-30 LAB — GLUCOSE BLD MANUAL STRIP-MCNC: 105 MG/DL (ref 74–99)

## 2024-04-30 PROCEDURE — 3700000002 HC GENERAL ANESTHESIA TIME - EACH INCREMENTAL 1 MINUTE: Performed by: UROLOGY

## 2024-04-30 PROCEDURE — 2500000005 HC RX 250 GENERAL PHARMACY W/O HCPCS: Performed by: ANESTHESIOLOGY

## 2024-04-30 PROCEDURE — 2500000005 HC RX 250 GENERAL PHARMACY W/O HCPCS: Performed by: NURSE ANESTHETIST, CERTIFIED REGISTERED

## 2024-04-30 PROCEDURE — 7100000002 HC RECOVERY ROOM TIME - EACH INCREMENTAL 1 MINUTE: Performed by: UROLOGY

## 2024-04-30 PROCEDURE — 82947 ASSAY GLUCOSE BLOOD QUANT: CPT

## 2024-04-30 PROCEDURE — 3700000001 HC GENERAL ANESTHESIA TIME - INITIAL BASE CHARGE: Performed by: UROLOGY

## 2024-04-30 PROCEDURE — 3600000009 HC OR TIME - EACH INCREMENTAL 1 MINUTE - PROCEDURE LEVEL FOUR: Performed by: UROLOGY

## 2024-04-30 PROCEDURE — 7100000009 HC PHASE TWO TIME - INITIAL BASE CHARGE: Performed by: UROLOGY

## 2024-04-30 PROCEDURE — 2500000001 HC RX 250 WO HCPCS SELF ADMINISTERED DRUGS (ALT 637 FOR MEDICARE OP): Performed by: ANESTHESIOLOGY

## 2024-04-30 PROCEDURE — 2720000007 HC OR 272 NO HCPCS: Performed by: UROLOGY

## 2024-04-30 PROCEDURE — 3600000004 HC OR TIME - INITIAL BASE CHARGE - PROCEDURE LEVEL FOUR: Performed by: UROLOGY

## 2024-04-30 PROCEDURE — 52648 LASER SURGERY OF PROSTATE: CPT | Performed by: UROLOGY

## 2024-04-30 PROCEDURE — 7100000001 HC RECOVERY ROOM TIME - INITIAL BASE CHARGE: Performed by: UROLOGY

## 2024-04-30 PROCEDURE — 2500000004 HC RX 250 GENERAL PHARMACY W/ HCPCS (ALT 636 FOR OP/ED): Performed by: ANESTHESIOLOGY

## 2024-04-30 PROCEDURE — 7100000010 HC PHASE TWO TIME - EACH INCREMENTAL 1 MINUTE: Performed by: UROLOGY

## 2024-04-30 PROCEDURE — 2500000004 HC RX 250 GENERAL PHARMACY W/ HCPCS (ALT 636 FOR OP/ED): Performed by: NURSE ANESTHETIST, CERTIFIED REGISTERED

## 2024-04-30 RX ORDER — HYDRALAZINE HYDROCHLORIDE 20 MG/ML
5 INJECTION INTRAMUSCULAR; INTRAVENOUS ONCE
Status: COMPLETED | OUTPATIENT
Start: 2024-04-30 | End: 2024-04-30

## 2024-04-30 RX ORDER — PHENYLEPHRINE HCL IN 0.9% NACL 1 MG/10 ML
SYRINGE (ML) INTRAVENOUS AS NEEDED
Status: DISCONTINUED | OUTPATIENT
Start: 2024-04-30 | End: 2024-04-30

## 2024-04-30 RX ORDER — CEFAZOLIN SODIUM 2 G/100ML
INJECTION, SOLUTION INTRAVENOUS AS NEEDED
Status: DISCONTINUED | OUTPATIENT
Start: 2024-04-30 | End: 2024-04-30

## 2024-04-30 RX ORDER — METOCLOPRAMIDE HYDROCHLORIDE 5 MG/ML
5 INJECTION INTRAMUSCULAR; INTRAVENOUS ONCE
Status: COMPLETED | OUTPATIENT
Start: 2024-04-30 | End: 2024-04-30

## 2024-04-30 RX ORDER — ONDANSETRON HYDROCHLORIDE 2 MG/ML
4 INJECTION, SOLUTION INTRAVENOUS ONCE
Status: COMPLETED | OUTPATIENT
Start: 2024-04-30 | End: 2024-04-30

## 2024-04-30 RX ORDER — PROPOFOL 10 MG/ML
INJECTION, EMULSION INTRAVENOUS AS NEEDED
Status: DISCONTINUED | OUTPATIENT
Start: 2024-04-30 | End: 2024-04-30

## 2024-04-30 RX ORDER — FAMOTIDINE 10 MG/ML
20 INJECTION INTRAVENOUS ONCE
Status: COMPLETED | OUTPATIENT
Start: 2024-04-30 | End: 2024-04-30

## 2024-04-30 RX ORDER — CEFAZOLIN SODIUM 2 G/100ML
2 INJECTION, SOLUTION INTRAVENOUS ONCE
Status: CANCELLED | OUTPATIENT
Start: 2024-04-30 | End: 2024-04-30

## 2024-04-30 RX ORDER — HYDROCODONE BITARTRATE AND ACETAMINOPHEN 5; 325 MG/1; MG/1
1 TABLET ORAL EVERY 6 HOURS PRN
Qty: 20 TABLET | Refills: 0 | Status: SHIPPED | OUTPATIENT
Start: 2024-04-30

## 2024-04-30 RX ORDER — SODIUM CHLORIDE 9 MG/ML
50 INJECTION, SOLUTION INTRAVENOUS CONTINUOUS
Status: DISCONTINUED | OUTPATIENT
Start: 2024-04-30 | End: 2024-04-30 | Stop reason: HOSPADM

## 2024-04-30 RX ORDER — FENTANYL CITRATE 50 UG/ML
INJECTION, SOLUTION INTRAMUSCULAR; INTRAVENOUS AS NEEDED
Status: DISCONTINUED | OUTPATIENT
Start: 2024-04-30 | End: 2024-04-30

## 2024-04-30 RX ORDER — ACETAMINOPHEN 325 MG/1
975 TABLET ORAL ONCE
Status: COMPLETED | OUTPATIENT
Start: 2024-04-30 | End: 2024-04-30

## 2024-04-30 RX ORDER — SODIUM CITRATE AND CITRIC ACID MONOHYDRATE 334; 500 MG/5ML; MG/5ML
30 SOLUTION ORAL ONCE
Status: COMPLETED | OUTPATIENT
Start: 2024-04-30 | End: 2024-04-30

## 2024-04-30 RX ORDER — LIDOCAINE HCL/PF 100 MG/5ML
SYRINGE (ML) INTRAVENOUS AS NEEDED
Status: DISCONTINUED | OUTPATIENT
Start: 2024-04-30 | End: 2024-04-30

## 2024-04-30 RX ORDER — CIPROFLOXACIN 500 MG/1
500 TABLET ORAL 2 TIMES DAILY
Qty: 14 TABLET | Refills: 0 | Status: SHIPPED | OUTPATIENT
Start: 2024-04-30 | End: 2024-05-07

## 2024-04-30 RX ORDER — MORPHINE SULFATE 2 MG/ML
1 INJECTION, SOLUTION INTRAMUSCULAR; INTRAVENOUS EVERY 5 MIN PRN
Status: DISCONTINUED | OUTPATIENT
Start: 2024-04-30 | End: 2024-04-30 | Stop reason: HOSPADM

## 2024-04-30 RX ORDER — ONDANSETRON HYDROCHLORIDE 2 MG/ML
4 INJECTION, SOLUTION INTRAVENOUS ONCE AS NEEDED
Status: DISCONTINUED | OUTPATIENT
Start: 2024-04-30 | End: 2024-04-30 | Stop reason: HOSPADM

## 2024-04-30 RX ORDER — MORPHINE SULFATE 2 MG/ML
2 INJECTION, SOLUTION INTRAMUSCULAR; INTRAVENOUS EVERY 5 MIN PRN
Status: DISCONTINUED | OUTPATIENT
Start: 2024-04-30 | End: 2024-04-30 | Stop reason: HOSPADM

## 2024-04-30 RX ORDER — ALBUTEROL SULFATE 0.83 MG/ML
2.5 SOLUTION RESPIRATORY (INHALATION) ONCE
Status: DISCONTINUED | OUTPATIENT
Start: 2024-04-30 | End: 2024-04-30 | Stop reason: HOSPADM

## 2024-04-30 RX ADMIN — CEFAZOLIN SODIUM 2 G: 2 INJECTION, SOLUTION INTRAVENOUS at 11:00

## 2024-04-30 RX ADMIN — LIDOCAINE HYDROCHLORIDE 60 MG: 20 INJECTION INTRAVENOUS at 11:09

## 2024-04-30 RX ADMIN — DEXAMETHASONE SODIUM PHOSPHATE 8 MG: 4 INJECTION, SOLUTION INTRAMUSCULAR; INTRAVENOUS at 10:07

## 2024-04-30 RX ADMIN — ACETAMINOPHEN 975 MG: 325 TABLET ORAL at 10:06

## 2024-04-30 RX ADMIN — Medication 3 L/MIN: at 11:00

## 2024-04-30 RX ADMIN — FAMOTIDINE 20 MG: 10 INJECTION, SOLUTION INTRAVENOUS at 10:06

## 2024-04-30 RX ADMIN — PROPOFOL 120 MG: 10 INJECTION, EMULSION INTRAVENOUS at 11:09

## 2024-04-30 RX ADMIN — SODIUM CHLORIDE 50 ML/HR: 9 INJECTION, SOLUTION INTRAVENOUS at 10:19

## 2024-04-30 RX ADMIN — METOCLOPRAMIDE 5 MG: 5 INJECTION, SOLUTION INTRAMUSCULAR; INTRAVENOUS at 10:06

## 2024-04-30 RX ADMIN — FENTANYL CITRATE 50 MCG: 50 INJECTION INTRAMUSCULAR; INTRAVENOUS at 11:09

## 2024-04-30 RX ADMIN — DEXAMETHASONE SODIUM PHOSPHATE 4 MG: 4 INJECTION, SOLUTION INTRAMUSCULAR; INTRAVENOUS at 11:13

## 2024-04-30 RX ADMIN — ONDANSETRON 4 MG: 2 INJECTION INTRAMUSCULAR; INTRAVENOUS at 10:06

## 2024-04-30 RX ADMIN — NITROGLYCERIN 0.5 INCH: 20 OINTMENT TOPICAL at 10:07

## 2024-04-30 RX ADMIN — HYDRALAZINE HYDROCHLORIDE 5 MG: 20 INJECTION INTRAMUSCULAR; INTRAVENOUS at 13:03

## 2024-04-30 RX ADMIN — SODIUM CITRATE AND CITRIC ACID MONOHYDRATE 30 ML: 500; 334 SOLUTION ORAL at 10:06

## 2024-04-30 RX ADMIN — Medication 100 MCG: at 11:26

## 2024-04-30 RX ADMIN — FENTANYL CITRATE 25 MCG: 50 INJECTION INTRAMUSCULAR; INTRAVENOUS at 11:56

## 2024-04-30 RX ADMIN — FENTANYL CITRATE 25 MCG: 50 INJECTION INTRAMUSCULAR; INTRAVENOUS at 11:32

## 2024-04-30 SDOH — HEALTH STABILITY: MENTAL HEALTH: CURRENT SMOKER: 0

## 2024-04-30 ASSESSMENT — PAIN SCALES - GENERAL
PAINLEVEL_OUTOF10: 0 - NO PAIN
PAINLEVEL_OUTOF10: 0 - NO PAIN
PAIN_LEVEL: 0
PAINLEVEL_OUTOF10: 0 - NO PAIN

## 2024-04-30 ASSESSMENT — PAIN - FUNCTIONAL ASSESSMENT
PAIN_FUNCTIONAL_ASSESSMENT: 0-10

## 2024-04-30 NOTE — ANESTHESIA PREPROCEDURE EVALUATION
Patient: Dale Mcmahan    Procedure Information       Date/Time: 04/30/24 0930    Procedure: pvp green light laser prostatectomy(green light laser) - pvp green light laser prostatectomy    (60 min) green light laser    Location: [facility] / [facility]    Surgeons: Reji Bae MD            Relevant Problems   Anesthesia (within normal limits)      Pulmonary   (+) Lung nodules      Endocrine   (+) Acquired hypothyroidism   (+) Dyslipidemia associated with type 2 diabetes mellitus (Multi)       Clinical information reviewed:   Tobacco  Allergies  Meds  Problems  Med Hx  Surg Hx   Fam Hx  Soc   Hx        NPO Detail:  NPO/Void Status  Date of Last Liquid: 04/29/24  Time of Last Liquid: 2100  Date of Last Solid: 04/29/24  Time of Last Solid: 2100         Physical Exam    Airway  Mallampati: II  TM distance: >3 FB  Neck ROM: full     Cardiovascular - normal exam     Dental   (+) upper dentures, lower dentures     Pulmonary - normal exam     Abdominal - normal exam         Anesthesia Plan    History of general anesthesia?: yes  History of complications of general anesthesia?: no    ASA 3     general     The patient is not a current smoker.    intravenous induction   Postoperative administration of opioids is intended.  Anesthetic plan and risks discussed with patient.  Use of blood products discussed with patient who.    Plan discussed with CRNA.

## 2024-04-30 NOTE — ANESTHESIA PROCEDURE NOTES
Airway  Date/Time: 4/30/2024 11:12 AM  Urgency: elective      Staffing  Performed: CRNA   Authorized by: OLIVIA Delvalle    Performed by: OLIVIA Delvalle  Patient location during procedure: OR    Indications and Patient Condition  Indications for airway management: anesthesia  Spontaneous Ventilation: absent  Sedation level: deep  Preoxygenated: yes  Patient position: sniffing  Mask difficulty assessment: 0 - not attempted  Planned trial extubation    Final Airway Details  Final airway type: supraglottic airway      Successful airway: Supraglottic airway: Igel.  Size 5     Number of attempts at approach: 1    Additional Comments  Atraumatic LMA insertion.

## 2024-04-30 NOTE — OP NOTE
pvp green light laser prostatectomy(green light laser) Operative Note     Date: 2024  OR Location: [facility]    Name: Dale Mcmahan, : 1935, Age: 88 y.o., MRN: [id number], Sex: male    Diagnosis  Pre-op Diagnosis     * Benign prostatic hyperplasia with lower urinary tract symptoms, symptom details unspecified [N40.1] Post-op Diagnosis     * Benign prostatic hyperplasia with lower urinary tract symptoms, symptom details unspecified [N40.1]     Procedures  pvp green light laser prostatectomy(green light laser)  89511 - [place] LASER VAPORIZATION OF PROSTATE FOR URINE FLOW      Surgeons      * Reji Bae - Primary    Resident/Fellow/Other Assistant:  Surgeons and Role:  * No surgeons found with a matching role *    Procedure Summary  Anesthesia: General  ASA: III  Anesthesia Staff: CRNA: SUSHIL Delvalle-CRNA  Estimated Blood Loss: 5mL  Intra-op Medications:   Administrations occurring from 0930 to 1100 on 24:   Medication Name Total Dose   oxygen (O2) therapy Cannot be calculated   sodium chloride 0.9% infusion 34.17 mL   acetaminophen (Tylenol) tablet 975 mg 975 mg   dexAMETHasone (Decadron) injection 8 mg 8 mg   famotidine PF (Pepcid) injection 20 mg 20 mg   metoclopramide (Reglan) injection 5 mg 5 mg   nitroglycerin (Guillermo-Bid) 2 % ointment 0.5 inch 0.5 inch   ondansetron (Zofran) injection 4 mg 4 mg   sodium citrate-citric acid (Bicitra) solution 30 mL 30 mL              Anesthesia Record               Intraprocedure I/O Totals          Intake    ceFAZolin in dextrose (iso-os) 2 gram/100 mL 100.00 mL    Total Intake 100 mL       Output    Urine 50 mL    Total Output 50 mL       Net    Net Volume 50 mL          Specimen: No specimens collected     Staff:   Circulator: Julee Walker RN  Relief Circulator: Darling Phillips RN  Scrub Person: Dixie Roger         Drains and/or Catheters:   Urethral Catheter Non-latex 22 Fr. (Active)       Tourniquet Times:         Implants:     Findings:  Very large prostate, about 90 cc, prostate tissue regrowth    Indications: Dale Mcmahan is an 88 y.o. male who is having surgery for Benign prostatic hyperplasia with lower urinary tract symptoms, symptom details unspecified [N40.1].  BPH, urinary retention    The patient was seen in the preoperative area. The risks, benefits, complications, treatment options, non-operative alternatives, expected recovery and outcomes were discussed with the patient. The possibilities of reaction to medication, pulmonary aspiration, injury to surrounding structures, bleeding, recurrent infection, the need for additional procedures, failure to diagnose a condition, and creating a complication requiring transfusion or operation were discussed with the patient. The patient concurred with the proposed plan, giving informed consent.  The site of surgery was properly noted/marked if necessary per policy. The patient has been actively warmed in preoperative area. Preoperative antibiotics have been ordered and given within 1 hours of incision. Venous thrombosis prophylaxis have been ordered including bilateral sequential compression devices    Procedure Details: Patient was identified in the preoperative holding area and brought into the room, placed in a supine position. After a general anesthesia was induced, patient was repositioned in a dorsal lithotomy position, genitalia area was prepped and draped in a routine standard fashion. A cystoscopy was performed with a 22F Olympus cystoscope, and the findings include normal anterior urethra, significant enlarged prostate/prostate tissue regrowth about 90 cc, significant trabeculated bladder, no tumor no stone. At this point greenlight laser fiber brought in a standard laser vaporization was performed, starting at the median lobe first, then left and right lateral lobe, finally anterior lobe both ureteral orifices are intact as well as sphincter. There is no bleeding throughout the case, a  22 Mongolian Pepe catheter inserted and balloon inflated up to 30 cc. Bladder was irrigated and the urine is clear.  Patient extubated and returned to PACU in a stable condition.  Complications:  None; patient tolerated the procedure well.    Disposition: PACU - hemodynamically stable.  Condition: stable       Plan  Pepe catheter to bag  Cipro 500 mg twice a day for 7 days  Norco x 20  Postop appointment in 1 week for voiding trial    Attending Attestation:     Reji Bae  Phone Number: [phone]

## 2024-04-30 NOTE — ANESTHESIA POSTPROCEDURE EVALUATION
Patient: Dale Mcmahan    Procedure Summary       Date: 04/30/24 Room / Location: [facility] / [facility]    Anesthesia Start: 1100 Anesthesia Stop: 1205    Procedure: pvp green light laser prostatectomy(green light laser) Diagnosis:       Benign prostatic hyperplasia with lower urinary tract symptoms, symptom details unspecified      (Benign prostatic hyperplasia with lower urinary tract symptoms, symptom details unspecified [N40.1])    Surgeons: Reji Bae MD Responsible Provider: OLIVIA Delvalle    Anesthesia Type: general ASA Status: 3            Anesthesia Type: general    Vitals Value Taken Time   /90 04/30/24 1310   Temp 36.4 °C (97.5 °F) 04/30/24 1300   Pulse 61 04/30/24 1310   Resp 15 04/30/24 1310   SpO2 95 % 04/30/24 1310       Anesthesia Post Evaluation    Patient location during evaluation: PACU  Patient participation: complete - patient participated  Level of consciousness: awake and alert  Pain score: 0  Pain management: adequate  Airway patency: patent  Cardiovascular status: hemodynamically stable and acceptable  Respiratory status: acceptable and room air  Hydration status: acceptable  Postoperative Nausea and Vomiting: none  Comments: PT INSTRUCTED TO FOLLOW UP WITH PCP REGARDING ELEVATED BP READINGS.  TX'D POSTOP WITH 5 mg APRESOLINE    No notable events documented.

## 2024-04-30 NOTE — INTERVAL H&P NOTE
H&P reviewed. The patient was examined and there are no changes to the H&P.    Heart: S1-S2  Lung: Clear

## 2024-05-06 ENCOUNTER — OFFICE VISIT (OUTPATIENT)
Dept: UROLOGY | Facility: CLINIC | Age: 89
End: 2024-05-06
Payer: MEDICARE

## 2024-05-06 DIAGNOSIS — N40.1 BENIGN PROSTATIC HYPERPLASIA WITH LOWER URINARY TRACT SYMPTOMS, SYMPTOM DETAILS UNSPECIFIED: Primary | ICD-10-CM

## 2024-05-06 DIAGNOSIS — R97.20 ELEVATED PSA: ICD-10-CM

## 2024-05-06 DIAGNOSIS — R33.9 URINARY RETENTION: ICD-10-CM

## 2024-05-06 PROCEDURE — 1159F MED LIST DOCD IN RCRD: CPT | Performed by: UROLOGY

## 2024-05-06 PROCEDURE — 1160F RVW MEDS BY RX/DR IN RCRD: CPT | Performed by: UROLOGY

## 2024-05-06 PROCEDURE — 99024 POSTOP FOLLOW-UP VISIT: CPT | Performed by: UROLOGY

## 2024-05-06 NOTE — PROGRESS NOTES
05/06/2024  Did well after PVP    Pepe catheter clear yellow urine    Catheter was removed without difficulty    We discussed the benign prostate hypertrophy, repeat PVP  We discussed the voiding trial  All the questions were answered, the patient expressed understanding and agreed to the plan.    Impression  Unstable bladder  BPH status post PVP x2  Bladder spasms   BPH  Nocturia  Elevated PSA, negative biopsy     Plan  Voiding trial today, call back 3 PM  Appointment in 3 months    Chief Complaint   Patient presents with    Benign Prostatic Hypertrophy     Patient is here today for a void trial.     Last Visit Plan:  Pepe catheter to bag  Cipro 500 mg twice a day for 7 days  Norco x 20  Postop appointment in 1 week for voiding trial          Physical Exam     TODAYS LAB RESULTS:      ASSESSMENT&PLAN:      IMPRESSIONS:      04/08/2024  Patient failed voiding trial and request to have PVP again     Exam: Catheter in place, urine clear yellow     We discussed PVP greenlight laser prostatectomy, indication risk-benefit and alternative  All the questions were answered, the patient expressed understanding and agreed to the plan.     Impression  Urinary retention, failed voiding trial  Unstable bladder  BPH, status post PVP  Bladder spasms   BPH  Nocturia  Elevated PSA, negative biopsy     Plan  PVP greenlight laser prostatectomy  Postop appointment in 1 week          Chief Complaint   Patient presents with    Benign Prostatic Hypertrophy       Patient here today for an ER follow up due to BPH.  Patient states that his urine bag was completely full of blood, x 2 bags.  Patient currently has catheter placed by the hospital.      Last Visit Plan:  Increase liquid intake  Watch voiding, call 2 PM this afternoon            Physical Exam      TODAYS LAB RESULTS:     Unable to leave urine sample.      ASSESSMENT&PLAN:        IMPRESSIONS:      03/29/2024  No voiding well subjectively, PVR 53 ml     We discussed the benign  prostate hypertrophy, status post show urinary retention voiding trial continue  All the questions were answered, the patient expressed understanding and agreed to the plan.     Impression  Unstable bladder  BPH“status post PVP  Bladder spasms   BPH  Nocturia  Elevated PSA, negative biopsy     Plan  Increase liquid intake  Watch voiding, call 2 PM this afternoon                Chief Complaint   Patient presents with    Benign Prostatic Hypertrophy       Patient had TOV yesterday. Nocturia 4-5x last night. He does not feel like he is emptying his bladder, however, PVR was low. He is drinking 3-4 cups of coffee each morning.          Physical Exam      TODAYS LAB RESULTS:     PVR 53 mL     POC Glucose, Urine  NEGATIVE mg/dl NEGATIVE   POC Bilirubin, Urine  NEGATIVE NEGATIVE   POC Ketones, Urine  NEGATIVE mg/dl NEGATIVE   POC Specific Gravity, Urine  1.005 - 1.035 1.025   POC Blood, Urine  NEGATIVE LARGE (3+) Abnormal    POC PH, Urine  No Reference Range Established PH 5.5   POC Protein, Urine  NEGATIVE, 30 (1+) mg/dl 100 (2+) Abnormal    POC Urobilinogen, Urine  0.2, 1.0 EU/DL 1.0   Poc Nitrite, Urine  NEGATIVE NEGATIVE   POC Leukocytes, Urine  NEGATIVE NEGATIVE      ASSESSMENT&PLAN:        IMPRESSIONS:       03/28/2024  Patient developed urinary retention, status post the PVP     Exam: Pepe catheter in place, urine clear yellow     Pepe catheter was removed without difficulty     We discussed benign prostate hypertrophy, status post PVP, recurrent urinary we discussed the voiding trial retention  Continue Flomax 0.8 mg daily  All the questions were answered, the patient expressed understanding and agreed to the plan.     Impression  Unstable bladder  BPH“status post PVP  Bladder spasms   BPH  Nocturia  Elevated PSA, negative biopsy     Plan  Voiding trial today,  Keep yearly follow-up appointment  Possible repeat PVP in the future     Patient last saw Dr. Small 05/11/2023.          Chief Complaint   Patient  presents with    Benign Prostatic Hypertrophy       Patient here for ER follow-up. He presented to the ER 03/25/2024 with urinary retention. Pepe catheter was placed. UA showed no signs of infection at ER. He is currently prescribed Tamsulosin 0.8 mg daily per Dr. Small. He has a hx of urinary obstruction associated with BPH s/p PVP done 05/10/2022.         Physical Exam      TODAYS LAB RESULTS:        ASSESSMENT&PLAN:        IMPRESSIONS:        5/11/2023 HBM  87-year-old male with a history of urinary retention his tamsulosin was increased from 0.4 to 0.8 mg daily patient states that he has a slightly stronger urinary stream he is still having some urinary frequency and nocturia x3 he has to wait after voiding and then occasionally he will start voiding again his postvoid residual today was 70 mL and his urinalysis was unremarkable. He is to continue on tamsulosin 0.8 mg daily and follow-up visit here in 6 months. Patient denies any side effects from the increased dose of tamsulosin to 0.8 he denies dizziness lightheadedness.   05/26/2022  Voiding okay, some frequency and nocturia     Patient has no nausea, no vomiting, no fever.     Patient here today for 1wk PVR following voiding trial done 5/19/22. Patient is status post PVP done 5/10/22.   Patient states he does have a lot of incontinence. He does have nocturia every hour and is wondering if there is something to help with this.He is still taking the tamsulosin 0.8mg daily. PVR 47ml  He did not leave urine sample today.     We discussed benign prostate hypertrophy, status post of PVP, unstable bladder symptom  All the questions were answered, the patient expressed understanding and agreed to the plan.     Impression  Unstable bladder  BPHâ€“status post PVP  Bladder spasms   BPH  Nocturia  Elevated PSA, negative biopsy     Plan  Time voiding, bladder training  Wean off Flomax 1 at a time  Appointment in 3 months        05/19/2022 addendum  Patient voids with  slow stream, no blood     PVR 90 cc     Continue voiding trial, call back tomorrow morning        05/19/2022  Did well after PVP, some bladder spasm     Exam: Gan catheter in place, urine clear yellow     Catheter was removed without difficulty     We discussed the post PVP, voiding trial  All the questions were answered, the patient expressed understanding and agreed to the plan.     Impression  BPHâ€“status post PVP  Bladder spasms   BPH  Nocturia  Elevated PSA, negative biopsy     Plan  Voiding trial today, call back 3 PM  Appointment in 1 week for PVR           04/25/2022     Patient here today for 1 month follow up on CIC teaching. Patient was to do CIC TID, but failed and had issues with infection. He was inpatient 3/28-4/1/22 for retention and UTI- has gan catheter in place currently. He is scheduled for PVP on 5/10/22.   Patient had been having issues with leakage around the catheter for the past few days. Last change done while patient was in hospital 3/28. Removed and replaced 18FF [facility]de [facility] Patient prepped with iodine. Catheter draining clear yellow urine and able to be flushed appropriately. Patient tolerated well. Patient to follow up as scheduled after he has PVP done.      Once again we discussed the benign prostate hypertrophy, urinary retention, failed CIC, on schedule for PVP  All the questions were answered, the patient expressed understanding and agreed to the plan.     Impression  BPH  Bladder spasms   BPH  Nocturia  Elevated PSA, negative biopsy     Plan  Continue Gan catheter  PVP greenlight laser prostatectomy  Appointment 1 week after           04/14/2022  Patient decided to proceed PVP greenlight laser prostatectomy     We discussed significant enlarged prostate, history of urinary retention  We discussed the cystoscopy and prostate ultrasound result  We discussed PVP greenlight laser prostatectomy, indication risk of benefit and alternative  All  the questions were answered, the patient expressed understanding and agreed to the plan.     Impression  BPH  Bladder spasms   BPH  Nocturia  Elevated PSA, negative biopsy     Plan  Continue 0.8mg Flomax daily  PVP greenlight laser prostatectomy  Appointment 1 week after        03/24/2022  Patient declined PVP greenlight laser prostatectomy, would like to do CIC     Patient has no nausea, no vomiting, no fever.     Patient here today for follow up urinary retention. Patient c/o leaking around catheter with bowel movement,  [place] and would like to discuss self catheterization.      We discussed benign prostate hypertrophy, urinary retention,  We discussed the CIC, indication the risk of benefit and alternative, bleeding trauma etc.  All the questions were answered, the patient expressed understanding and agreed to the plan.     Impression  BPH  Bladder spasms   BPH  Nocturia  Elevated PSA, negative biopsy     Plan  CIC teaching today  CIC tid  Appointment in 1 month           02/24/2022  Patient voiding relatively okay     Cystoscopy     Cystoscopy was performed under local without difficulty:     Findings: Normal anterior urethra, significant enlarged prostate; significant trabeculated bladder; no tumor no stone     Pain evaluation: 0/10 before, 2/10 after.     Prostate ultrasound.     Prostate volume 134 cc     Pain evaluation: 0/10 before, 2/10 after.     We discussed significant enlarged prostate, history of urinary retention  We discussed the cystoscopy and prostate ultrasound result  We discussed PVP greenlight laser prostatectomy, indication risk of benefit and alternative  All the questions were answered, the patient expressed understanding and agreed to the plan.     Impression  BPH  Bladder spasms   BPH  Nocturia  Elevated PSA, negative biopsy     Plan  Continue 0.8mg Flomax daily   PVP greenlight laser prostatectomy   Continue monitoring elevated PSA              02/11/22:   Patient  originally here today on nurse schedule for 4wk indwelling gan change. He would like to discuss possible voiding trial instead.      Patient takes 0.8mg Flomax daily.     Patient has no nausea, vomiting, or fever      Exam: Gan catheter was removed without difficulty. urine clear yellow     We discussed recurring urinary retention, voiding trial, increase liquid intake, BPH workup followed by possible PVP greenlight laser prostatectomy, risks, benefits, alternatives   We discussed benign prostatic hyperplasia with mild voiding symptoms, continue 0.8mg Flomax daily  We discussed history of elevated PSA, stable, cessation of PSA due to age  All the questions were answered, the patient expressed understanding and agreed to the plan.     Impression  Urinary retention   Bladder spasms   BPH  Nocturia  Elevated PSA, negative biopsy     Plan  Voiding trial, ER if unable to void  Increase liquid intake  Continue 0.8mg Flomax daily  Keep Cystoscopy, prostate US  Possible PVP greenlight laser prostatectomy               01/21/22:   Patient here today for ED follow on 1/14/22 for urinary retention and gan cath was placed. Patient would like to review option for BPH. Patient inquires about self-catheterization. Failed voiding trial twice.     Patient takes 0.8mg Flomax daily.     Patient has no nausea, vomiting, or fever      We discussed recurring urinary retention, keep gan catheter in place, BPH workup followed by possible PVP greenlight laser prostatectomy, risks, benefits, alternatives   We discussed benign prostatic hyperplasia with mild voiding symptoms, continue 0.8mg Flomax daily  We discussed history of elevated PSA, stable, cessation of PSA due to age  All the questions were answered, the patient expressed understanding and agreed to the plan.     Impression  Urinary retention   Bladder spasms   BPH  Nocturia  Elevated PSA, negative biopsy     Plan  Keep Gan catheter in place  Continue 0.8mg Flomax  daily  Cystoscopy, prostate US  Possible PVP greenlight laser prostatectomy            12/01/21:   Patient is here today to void trial. Patient went to ED on 11/26/21 Due to him not being able to void and a gan cath was put in. This has occurred 3x prior. He denies hematuria, besides after catheter was placed.      Patient takes 0.8mg Flomax daily.     Patient has no nausea, vomiting, or fever      Exam: Catheter removed without difficulty, urine clear yellow     We discussed recurring urinary retention, gan catheter, voiding trial, increase liquid intake, call at 3pm  We discussed benign prostatic hyperplasia with mild voiding symptoms, continue 0.8mg Flomax daily  We discussed history of elevated PSA, stable, cessation of PSA due to age  All the questions were answered, the patient expressed understanding and agreed to the plan.     Impression  Urinary retention   Bladder spasms   BPH  Nocturia  Elevated PSA, negative biopsy     Plan  Voiding trial, call at 3pm  Increase liquid intake   Continue 0.8mg Flomax daily   No more PSA  Keep yearly appointment           03/12/21  Voiding well, nocturia 1-2 on Flomax 0.8 mg daily     Patient has no nausea, no vomiting, no fever.     SUAD: Deferred     No more PSA     PVR: 120 cc     IO UA (automated w/o microscopy)           12Mar2021 10:52AM          Reji Bae     Test Name       Result     Flag        Reference  IO Glucose - Urine         Negative                  IO Bilirubin       Negative                  IO Ketones      Negative                  IO Specific Gravity         1.020                       IO Blood          Trace                       IO pH               7.0                           IO Protein, Urine            Negative                  IO Urobilinogen                                              Normal (0.2-1.0 mg/dl)  IO Nitrite, Urine              Negative                  IO Leukocytes Negative                  IO Glucose - Urine         Negative                   IO Bilirubin       Negative                  IO Ketones      Negative                  IO Specific Gravity         1.020                       IO Blood          Trace                       IO pH               7.0                           IO Protein, Urine            Negative                  IO Urobilinogen                                              Normal (0.2-1.0 mg/dl)  IO Nitrite, Urine              Negative                  IO Leukocytes Negative                                                            We discussed benign prostate hypertrophy, urinary retention, resolved, incomplete emptying  We discussed continue Flomax  All the questions were answered, the patient expressed understanding and agreed to the plan.     Impression  Urinary retention   Bladder spasms   BPH  Nocturia  Elevated PSA, negative biopsy     Plan  Continue Flomax 04 mg 2 tablets daily        Yearly SUAD and PSA        09/11/2020  Voiding well, stream current     Patient has no nausea, no vomiting, no fever.      cc     IO Ultrasound, measurement post-void resid urine and/or bl cap; no imag         [id number] 01:26PM          Reji Bae     Test Name       Result     Flag        Reference  IO Ultrasound, measurement post void resid urine and/or bl cap; non-imag       238 ml/min                                                                                                                    IO UA (automated w/o microscopy)           [id number] 01:18PM          Reji Bae     Test Name       Result     Flag        Reference  IO Glucose - Urine         Negative                  IO Bilirubin       Negative                  IO Ketones      Negative                  IO Specific Gravity         1.015                       IO Blood          (+)small - 15                          IO pH               6.0                           IO Protein, Urine            Negative                  IO Urobilinogen                                               Normal (0.2-1.0 mg/dl)  IO Nitrite, Urine              Negative                  IO Leukocytes Negative                  IO Glucose - Urine         Negative                  IO Bilirubin       Negative                  IO Ketones      Negative                  IO Specific Gravity         1.015                       IO Blood          (+)small - 15                          IO pH               6.0                           IO Protein, Urine            Negative                  IO Urobilinogen                                              Normal (0.2-1.0 mg/dl)  IO Nitrite, Urine              Negative                  IO Leukocytes Negative      We discussed benign prostate hypertrophy, urinary retention, resolved, incomplete emptying  We discussed continue Flomax  All the questions were answered, the patient expressed understanding and agreed to the plan.     Impression  Urinary retention   Bladder spasms   BPH  Nocturia  Elevated PSA, negative biopsy     Plan  Continue Flomax 04 mg 2 tablets daily        Appointment in 6 months           09/04/2020  Patient developed urinary retention and had a Pepe catheter in for 2 weeks, on doxazosin and oxybutynin     Patient has no nausea, no vomiting, no fever.     Exam: Pepe catheter in place, urine clear yellow     Pepe catheter was removed without difficulty        PSA 8/2/20 8.27. Stable     We discussed benign prostate hypertrophy, urinary retention, voiding trial, Flomax 2 tablets daily  All the questions were answered, the patient expressed understanding and agreed to the plan.     Impression  Urinary retention   Bladder spasms   BPH  Nocturia  Elevated PSA, negative biopsy     Plan  Voiding trial today, ER if unable to void  DC oxybutynin  DC doxazosin  Flomax 04 mg 2 tablets daily  Appointment in 1 week for PVR  Possible BPH workup     I spent 25 minutes with this patient. Greater than 50% of this time was spent in counseling and/or coordination of  "care        8/19/2020 Cindy Barrow CNP      85 year old male patient here today due to isues with catheter not draining correctly, especially when standing up. He was seen in the ER on 8/16/20 due urinary retention and Pepe catheter was placed at that time. He has failed void trials in the past. He was last seen in the office 9/6/19 per Dr. Bae for urinary retention, PVR was WNL at that time/ Patient states since a few hours after having catheter placed, he has been having discomfort around catheter insertion site. He doesn't think that the catheter is draining correctly. Sometimes he will feel the urge to urinate and \"push\" he will leakage around his catheter. He states that everything is fine when he is laying down and his catheter is draining appropriately. Flushed catheter without difficulty. Catheter secure is pulling on catheter. Drainage bag also noted to be below his knee. Patient has erythema to inner right thigh due to catheter pulling his penis. Asked patient to not use the cath secure and to keep his drainage bag above his knee. I believe that he is having bladder spasms from pulling on the catheter. I will start an antibiotic due to possible UTI. Will make follow up appointment for void trial/to discuss PVP. All the questions were answered, the patient expressed understanding and agreed to the plan.     Impression  Urinary retention   Bladder spasms   BPH  Nocturia  Elevated PSA, negative biopsy     Plan  Continue Pepe catheter, avoid pulling on catheter, keep bag above knee  Start Keflex 500 mg BID #14   Void trial in 1-2 weeks   PSA overdue   Possible PVP in the future           I spent 25 minutes with this patient. Greater than 50% of this time was spent in counseling and/or coordination of care              09/06/2019  Patient voiding well during the day, but still nocturia 1-2 hours, on doxazosin 4 mg daily     Patient has no nausea, no vomiting, no fever.     PVR: Minimal 65 cc     We discussed " benign prostate hypertrophy, nocturia, minimal PVR, conservative management versus PVP  All the questions were answered, the patient expressed understanding and agreed to the plan.     Impression  BPH  Nocturia  Elevated PSA, negative biopsy     Plan  Conservative management  Cutback liquid intake after 6 PM  Yearly SUAD and a PSA  Possible PVP in the future           08/29/2019  Patient here for post-prostate biopsy follow-up     Patient did well post biopsy     Pathology: No malignancy     Exam: Pepe catheter in place, clear yellow urine     We discussed pathology  We discussed benign prostate hypertrophy very large prostate, all Flomax, voiding trial one more time, possible PVP greenlight laser prostatectomy     Impression  Left epididymoorchitis, acute  BPH, intractable urinary retention  Elevated PSA     Plan  Voiding trial today, call back 3 PM  Appointment in 1 week for PVR  Possible greenlight laser prostatectomy     I spent 25 minutes with this patient. Greater than 50% of this time was spent in counseling and/or coordination of care        07/19/2019  Transrectal ultrasound-guided prostate biopsy     Biopsy was performed under local without difficulty     A total 12 cores specimen obtained     Prostate volume 142 cc     Pain evaluation: 0/10 before, 2/10 after.     We discussed the post biopsy instructions, continue Pepe catheter insertion  All the questions were answered, the patient expressed understanding and agreed to the plan.     Impression  Left epididymoorchitis, acute  BPH, intractable urinary retention  Elevated PSA     Plan  Await pathology  Continue Pepe catheter indwelling  Possible PVP greenlight laser prostatectomy  Appointment in 2 weeks           06/24/2019  Patient failed voiding trial, Pepe catheter was inserted into ER, also complaining left scrotal pain.     Patient has no nausea, no vomiting, no fever.     Exam: Left testicle is 3 times enlarged, tender, right testicle normal      Gan catheter, concentrated urine     Scrotal ultrasound  IMPRESSION:  1. The left epididymis appears hypervascular suggesting epididymitis. There is  also mild increase in vascularity of the left testicle which may be relate to  orchitis.     2. Moderate to large size complicated left-sided hydrocele with internal  septation, infections or hemorrhage is not excluded.     3. There is an indeterminate 5 x 4 x 4 mm cystic lesion in the right testicle.  Close continued surveillance is recommended.     4. Moderate-sized hydrocele with internal debris is present in the right  hemiscrotum.     Impression  Left epididymoorchitis, acute  BPH, intractable urinary retention  Elevated PSA     Plan  Bactrim DS 1 tablet twice day for 3 weeks  Prostate transrectal ultrasound-guided biopsy in 3 weeks  Cystoscopy later  Possible PVP greenlight laser prostatectomy     I spent 25 minutes with this patient. Greater than 50% of this time was spent in counseling and/or coordination of care        06/17/2019  84-year-old white male developed acute onset of urinary retention, gan catheter was inserted in the emergency room a week ago. The catheter was working well     Patient has no nausea, no vomiting, no fever.     Exam: Gan catheter clear yellow, removed without difficulty     We discussed benign prostate hypertrophy, urinary retention, voiding trial, possible BPH workup in the future. All the questions were answered, the patient expressed understanding and agreed to the plan.     Impression  BPH  Acute urinary retention     Plan  Continue Flomax 0.4 mg daily  Voiding trial today callbacks 3 PM  Appointment in 1 week for PVR              PSA  8/2/20 8.27.  12/24/18 7.90     Surgery  4/30/2024 PVP greenlight laser prostatectomy will repeat  5/10/2022 PVP greenlight laser prostatectomy  7/19/19 prostate biopsy negative

## 2024-06-04 ENCOUNTER — TELEPHONE (OUTPATIENT)
Dept: PRIMARY CARE | Facility: CLINIC | Age: 89
End: 2024-06-04
Payer: MEDICARE

## 2024-06-04 DIAGNOSIS — M54.9 OTHER CHRONIC BACK PAIN: ICD-10-CM

## 2024-06-04 DIAGNOSIS — G89.29 OTHER CHRONIC BACK PAIN: ICD-10-CM

## 2024-06-04 RX ORDER — OXYCODONE AND ACETAMINOPHEN 5; 325 MG/1; MG/1
1 TABLET ORAL DAILY
Qty: 30 TABLET | Refills: 0 | Status: SHIPPED | OUTPATIENT
Start: 2024-06-04

## 2024-06-04 NOTE — TELEPHONE ENCOUNTER
Med Refill   oxyCODONE-acetaminophen (Percocet) 5-325 mg tablet [[id number]]     [address] - [address] [place] [id number]  Phone: [phone]  Fax: [phone]     LOV: 4/25/24    NOV: N/A

## 2024-06-04 NOTE — TELEPHONE ENCOUNTER
Med Refill   oxyCODONE-acetaminophen (Percocet) 5-325 mg tablet [[id number]]      [address] - [address] [place] [id number]  Phone: [phone]  Fax: [phone]      LOV: 4/25/24     NOV: N/A

## 2024-07-16 ENCOUNTER — TELEPHONE (OUTPATIENT)
Dept: PRIMARY CARE | Facility: CLINIC | Age: 89
End: 2024-07-16
Payer: MEDICARE

## 2024-07-16 DIAGNOSIS — M54.9 OTHER CHRONIC BACK PAIN: ICD-10-CM

## 2024-07-16 DIAGNOSIS — G89.29 OTHER CHRONIC BACK PAIN: ICD-10-CM

## 2024-07-16 NOTE — TELEPHONE ENCOUNTER
Med Refill   oxyCODONE-acetaminophen (Percocet) 5-325 mg tablet [[id number]]     [facility]  - [place], [place] - [address] [address]  Phone: [phone]  Fax: [phone]  DMITRY #: --     LOV: 4/25/24    NOV: 9/16/24

## 2024-07-17 RX ORDER — OXYCODONE AND ACETAMINOPHEN 5; 325 MG/1; MG/1
1 TABLET ORAL DAILY
Qty: 30 TABLET | Refills: 0 | Status: SHIPPED | OUTPATIENT
Start: 2024-07-17

## 2024-08-07 ENCOUNTER — TELEPHONE (OUTPATIENT)
Dept: PRIMARY CARE | Facility: CLINIC | Age: 89
End: 2024-08-07
Payer: MEDICARE

## 2024-08-07 DIAGNOSIS — E03.9 HYPOTHYROIDISM, UNSPECIFIED TYPE: ICD-10-CM

## 2024-08-07 DIAGNOSIS — R33.8 URINARY RETENTION DUE TO BENIGN PROSTATIC HYPERPLASIA: Primary | ICD-10-CM

## 2024-08-07 DIAGNOSIS — E78.5 DYSLIPIDEMIA, GOAL LDL BELOW 100: ICD-10-CM

## 2024-08-07 DIAGNOSIS — N40.1 URINARY RETENTION DUE TO BENIGN PROSTATIC HYPERPLASIA: Primary | ICD-10-CM

## 2024-08-07 RX ORDER — PRAVASTATIN SODIUM 40 MG/1
40 TABLET ORAL NIGHTLY
Qty: 90 TABLET | Refills: 3 | Status: SHIPPED | OUTPATIENT
Start: 2024-08-07 | End: 2025-08-02

## 2024-08-07 RX ORDER — LEVOTHYROXINE SODIUM 112 UG/1
112 TABLET ORAL DAILY
Qty: 90 TABLET | Refills: 3 | Status: SHIPPED | OUTPATIENT
Start: 2024-08-07

## 2024-08-07 RX ORDER — TAMSULOSIN HYDROCHLORIDE 0.4 MG/1
0.4 CAPSULE ORAL DAILY
Qty: 90 CAPSULE | Refills: 3 | Status: SHIPPED | OUTPATIENT
Start: 2024-08-07

## 2024-08-07 NOTE — TELEPHONE ENCOUNTER
Med Refill   levothyroxine (Synthroid, Levoxyl) 112 mcg tablet [[id number]]   pravastatin (Pravachol) 40 mg tablet [[id number]]   tamsulosin (Flomax) 0.4 mg 24 hr capsule [[id number]]     [facility]  - [place], [place] - [address] [facility]  [address] [facility] [address]  Phone: [phone]  Fax: [phone]  DMITRY #: --

## 2024-08-26 ENCOUNTER — TELEPHONE (OUTPATIENT)
Dept: PRIMARY CARE | Facility: CLINIC | Age: 89
End: 2024-08-26
Payer: MEDICARE

## 2024-08-26 DIAGNOSIS — M54.9 OTHER CHRONIC BACK PAIN: ICD-10-CM

## 2024-08-26 DIAGNOSIS — G89.29 OTHER CHRONIC BACK PAIN: ICD-10-CM

## 2024-08-26 NOTE — TELEPHONE ENCOUNTER
Refill on Oxycodone-acetaminophen 5/325 mg   1 tablet once daily    To [facility]    Last OV: 4-25-24  Next OV: 9-16-24

## 2024-08-27 RX ORDER — OXYCODONE AND ACETAMINOPHEN 5; 325 MG/1; MG/1
1 TABLET ORAL DAILY
Qty: 30 TABLET | Refills: 0 | Status: SHIPPED | OUTPATIENT
Start: 2024-08-27

## 2024-09-16 ENCOUNTER — APPOINTMENT (OUTPATIENT)
Dept: PRIMARY CARE | Facility: CLINIC | Age: 89
End: 2024-09-16
Payer: MEDICARE

## 2024-09-23 ENCOUNTER — APPOINTMENT (OUTPATIENT)
Dept: PRIMARY CARE | Facility: CLINIC | Age: 89
End: 2024-09-23
Payer: MEDICARE

## 2024-09-24 ENCOUNTER — OFFICE VISIT (OUTPATIENT)
Dept: PRIMARY CARE | Facility: CLINIC | Age: 89
End: 2024-09-24
Payer: MEDICARE

## 2024-09-24 VITALS
WEIGHT: 149 LBS | BODY MASS INDEX: 21.33 KG/M2 | DIASTOLIC BLOOD PRESSURE: 80 MMHG | HEART RATE: 68 BPM | HEIGHT: 70 IN | SYSTOLIC BLOOD PRESSURE: 122 MMHG

## 2024-09-24 DIAGNOSIS — N40.1 URINARY RETENTION DUE TO BENIGN PROSTATIC HYPERPLASIA: ICD-10-CM

## 2024-09-24 DIAGNOSIS — E11.69 DYSLIPIDEMIA ASSOCIATED WITH TYPE 2 DIABETES MELLITUS (MULTI): ICD-10-CM

## 2024-09-24 DIAGNOSIS — Z51.81 THERAPEUTIC DRUG MONITORING: ICD-10-CM

## 2024-09-24 DIAGNOSIS — R33.8 URINARY RETENTION DUE TO BENIGN PROSTATIC HYPERPLASIA: ICD-10-CM

## 2024-09-24 DIAGNOSIS — E11.9 DIABETES MELLITUS TYPE 2, DIET-CONTROLLED: Primary | ICD-10-CM

## 2024-09-24 DIAGNOSIS — Z23 FLU VACCINE NEED: ICD-10-CM

## 2024-09-24 DIAGNOSIS — E03.9 ACQUIRED HYPOTHYROIDISM: ICD-10-CM

## 2024-09-24 DIAGNOSIS — E78.5 DYSLIPIDEMIA ASSOCIATED WITH TYPE 2 DIABETES MELLITUS (MULTI): ICD-10-CM

## 2024-09-24 PROCEDURE — 90662 IIV NO PRSV INCREASED AG IM: CPT | Performed by: INTERNAL MEDICINE

## 2024-09-24 PROCEDURE — 1160F RVW MEDS BY RX/DR IN RCRD: CPT | Performed by: INTERNAL MEDICINE

## 2024-09-24 PROCEDURE — 1159F MED LIST DOCD IN RCRD: CPT | Performed by: INTERNAL MEDICINE

## 2024-09-24 PROCEDURE — 99213 OFFICE O/P EST LOW 20 MIN: CPT | Performed by: INTERNAL MEDICINE

## 2024-09-24 PROCEDURE — 1036F TOBACCO NON-USER: CPT | Performed by: INTERNAL MEDICINE

## 2024-09-24 PROCEDURE — G0008 ADMIN INFLUENZA VIRUS VAC: HCPCS | Performed by: INTERNAL MEDICINE

## 2024-09-24 NOTE — ASSESSMENT & PLAN NOTE
Continues on low-dose Percocet 1 tablet daily for chronic back pain compliant with regular medical therapy contract completed low abuse potential

## 2024-09-24 NOTE — ASSESSMENT & PLAN NOTE
Reevaluate thyroid functions on levothyroxine 112 mcg daily  Orders:    Tsh With Reflex To Free T4 If Abnormal; Future

## 2024-09-24 NOTE — PROGRESS NOTES
"Subjective   Patient ID: Dale Mcmahan is a 89 y.o. male who presents for Follow-up.    HPI     Review of Systems    Objective   /80   Pulse 68   Ht 1.778 m (5' 10\")   Wt 67.6 kg (149 lb)   BMI 21.38 kg/m²     Physical Exam    Assessment/Plan          "

## 2024-09-24 NOTE — ASSESSMENT & PLAN NOTE
Reevaluate hemoglobin A1c stable at 6.8 last visit with diet control  Orders:    Comprehensive Metabolic Panel; Future    Hemoglobin A1C; Future    Lipid Panel; Future    Albumin-Creatinine Ratio, Urine Random; Future

## 2024-09-24 NOTE — PROGRESS NOTES
Subjective   Reason for Visit: Dale Mcmahan is an 89 y.o. male here for a Medicare Wellness visit.     Past Medical, Surgical, and Family History reviewed and updated in chart.    Reviewed all medications by prescribing practitioner or clinical pharmacist (such as prescriptions, OTCs, herbal therapies and supplements) and documented in the medical record.    HPI    Patient Care Team:  Jackson Zhang DO as PCP - General  Jackson Zhang DO as PCP - [employer] PCP     Review of Systems   All other systems reviewed and are negative.  OARRS:  No data recorded  I have personally reviewed the OARRS report for Dale Mcmahan. I have considered the risks of abuse, dependence, addiction and diversion    Is the patient prescribed a combination of a benzodiazepine and opioid?  No    Last Urine Drug Screen / ordered today: Yes  No results found for this or any previous visit (from the past 8760 hour(s)).  Results are as expected.     Clinical rationale for not completing a Urine Drug Screen: February 14, 2024      Controlled Substance Agreement:  Date of the Last Agreement: February 14, 2024  Reviewed Controlled Substance Agreement including but not limited to the benefits, risks, and alternatives to treatment with a Controlled Substance medication(s).    Opioids:  What is the patient's goal of therapy? y  Is this being achieved with current treatment? y    I have calculated the patient's Morphine Dose Equivalent (MED):   I have considered referral to Pain Management and/or a specialist, and do not feel it is necessary at this time.    I feel that it is clinically indicated to continue this current medication regimen after consideration of alternative therapies, and other non-opioid treatment.    Opioid Risk Screening:  No data recorded    Pain Assessment:  Analgesia  What was your pain level on average during the past week?: 5  What was your pain level at its worst during the past week?: 7  What  "percentage of your pain has been relieved during the past week?: 75 %  Is the amount of pain relief you are now obtaining from your current pain relievers enough to make a real difference in your life?: Y  Query to Clinician: Is the patient's pain relief clinically significant?: Unsure    Activities of Daily Living  Physical Functioning: Same  Family Relationships: Same  Social Relationships: Same  Mood: Same  Sleep Patterns: Same  Overall Functioning: Same    Adverse Events  Is patient experiencing any side effects from current pain relievers?: N  Patient's Overall Severity of Side Effects: None        Objective   Vitals:  /80   Pulse 68   Ht 1.778 m (5' 10\")   Wt 67.6 kg (149 lb)   BMI 21.38 kg/m²       Physical Exam  Vitals and nursing note reviewed.   Constitutional:       General: He is not in acute distress.     Appearance: Normal appearance. He is well-developed. He is not toxic-appearing.   HENT:      Head: Normocephalic and atraumatic.      Right Ear: Tympanic membrane and external ear normal.      Left Ear: Tympanic membrane and external ear normal.      Nose: Nose normal.      Mouth/Throat:      Mouth: Mucous membranes are moist.      Pharynx: Oropharynx is clear. No oropharyngeal exudate or posterior oropharyngeal erythema.      Tonsils: No tonsillar exudate. 2+ on the right. 2+ on the left.   Eyes:      Extraocular Movements: Extraocular movements intact.      Conjunctiva/sclera: Conjunctivae normal.   Cardiovascular:      Rate and Rhythm: Normal rate and regular rhythm.      Pulses: Normal pulses.      Heart sounds: Normal heart sounds. No murmur heard.  Pulmonary:      Effort: Pulmonary effort is normal.      Breath sounds: Normal breath sounds.   Abdominal:      General: Abdomen is flat. Bowel sounds are normal.      Palpations: Abdomen is soft.   Musculoskeletal:      Cervical back: Neck supple.   Feet:      Right foot:      Skin integrity: Skin integrity normal. No ulcer, blister, skin " breakdown, erythema, warmth or callus.      Toenail Condition: Right toenails are normal.      Left foot:      Skin integrity: Skin integrity normal. No ulcer, blister, skin breakdown, erythema, warmth or callus.      Toenail Condition: Left toenails are normal.   Lymphadenopathy:      Cervical: No cervical adenopathy.   Skin:     General: Skin is warm and dry.      Capillary Refill: Capillary refill takes more than 3 seconds.      Findings: No rash.   Neurological:      Mental Status: He is alert. Mental status is at baseline.      Sensory: Sensation is intact.   Psychiatric:         Mood and Affect: Mood normal.         Behavior: Behavior normal.         Thought Content: Thought content normal.         Judgment: Judgment normal.         Assessment & Plan  Flu vaccine need  High-dose influenza vaccine administered today  Orders:    Flu vaccine, trivalent, preservative free, HIGH-DOSE, age 65y+ (Fluzone)    Dyslipidemia associated with type 2 diabetes mellitus (Multi)  Reevaluate lipid profile on pravastatin 40 mg daily LDL goal less than 70  Orders:    Comprehensive Metabolic Panel; Future    Hemoglobin A1C; Future    Lipid Panel; Future    Albumin-Creatinine Ratio, Urine Random; Future    Acquired hypothyroidism  Reevaluate thyroid functions on levothyroxine 112 mcg daily  Orders:    Tsh With Reflex To Free T4 If Abnormal; Future    Diabetes mellitus type 2, diet-controlled  Reevaluate hemoglobin A1c stable at 6.8 last visit with diet control  Orders:    Comprehensive Metabolic Panel; Future    Hemoglobin A1C; Future    Lipid Panel; Future    Albumin-Creatinine Ratio, Urine Random; Future    Therapeutic drug monitoring  Continues on low-dose Percocet 1 tablet daily for chronic back pain compliant with regular medical therapy contract completed low abuse potential       Urinary retention due to benign prostatic hyperplasia  Stable continues to follow-up with urologist on tamsulosin 0.4 mg daily

## 2024-09-24 NOTE — ASSESSMENT & PLAN NOTE
Reevaluate lipid profile on pravastatin 40 mg daily LDL goal less than 70  Orders:    Comprehensive Metabolic Panel; Future    Hemoglobin A1C; Future    Lipid Panel; Future    Albumin-Creatinine Ratio, Urine Random; Future

## 2024-10-14 ENCOUNTER — TELEPHONE (OUTPATIENT)
Dept: PRIMARY CARE | Facility: CLINIC | Age: 89
End: 2024-10-14
Payer: MEDICARE

## 2024-10-14 DIAGNOSIS — M54.89 OTHER CHRONIC BACK PAIN: ICD-10-CM

## 2024-10-14 DIAGNOSIS — G89.29 OTHER CHRONIC BACK PAIN: ICD-10-CM

## 2024-10-14 RX ORDER — OXYCODONE AND ACETAMINOPHEN 5; 325 MG/1; MG/1
1 TABLET ORAL DAILY
Qty: 30 TABLET | Refills: 0 | Status: SHIPPED | OUTPATIENT
Start: 2024-10-14

## 2024-10-14 NOTE — TELEPHONE ENCOUNTER
Needs a refill on his Oxycodone 5-325 mg takes it 1 time a day last apt 10/7 or 10/8 next apt January please send to [facility] [place]

## 2024-12-13 DIAGNOSIS — M54.89 OTHER CHRONIC BACK PAIN: ICD-10-CM

## 2024-12-13 DIAGNOSIS — G89.29 OTHER CHRONIC BACK PAIN: ICD-10-CM

## 2024-12-13 RX ORDER — OXYCODONE AND ACETAMINOPHEN 5; 325 MG/1; MG/1
1 TABLET ORAL DAILY
Qty: 30 TABLET | Refills: 0 | Status: SHIPPED | OUTPATIENT
Start: 2024-12-13

## 2024-12-13 NOTE — TELEPHONE ENCOUNTER
Patient called to request medication refill.    Last appointment with our providers: 09/24/2024  Next appointment with our providers: 01/24/2025  Name of Medication: oxyCODONE-acetaminophen (Percocet) 5-325 mg tablet     Pharmacy:   [facility] [address] [place] [id number]  Phone: [phone]  Fax: [phone]

## 2024-12-19 ENCOUNTER — TELEPHONE (OUTPATIENT)
Dept: PRIMARY CARE | Facility: CLINIC | Age: 89
End: 2024-12-19

## 2025-01-23 ENCOUNTER — LAB (OUTPATIENT)
Dept: LAB | Facility: LAB | Age: 89
End: 2025-01-23
Payer: MEDICARE

## 2025-01-23 DIAGNOSIS — E78.5 DYSLIPIDEMIA ASSOCIATED WITH TYPE 2 DIABETES MELLITUS (MULTI): ICD-10-CM

## 2025-01-23 DIAGNOSIS — E03.9 ACQUIRED HYPOTHYROIDISM: ICD-10-CM

## 2025-01-23 DIAGNOSIS — E11.9 DIABETES MELLITUS TYPE 2, DIET-CONTROLLED: ICD-10-CM

## 2025-01-23 DIAGNOSIS — E11.69 DYSLIPIDEMIA ASSOCIATED WITH TYPE 2 DIABETES MELLITUS (MULTI): ICD-10-CM

## 2025-01-23 LAB
ALBUMIN SERPL BCP-MCNC: 4.3 G/DL (ref 3.4–5)
ALP SERPL-CCNC: 90 U/L (ref 33–136)
ALT SERPL W P-5'-P-CCNC: 14 U/L (ref 10–52)
ANION GAP SERPL CALC-SCNC: 12 MMOL/L (ref 10–20)
AST SERPL W P-5'-P-CCNC: 16 U/L (ref 9–39)
BILIRUB SERPL-MCNC: 0.5 MG/DL (ref 0–1.2)
BUN SERPL-MCNC: 14 MG/DL (ref 6–23)
CALCIUM SERPL-MCNC: 9.5 MG/DL (ref 8.6–10.3)
CHLORIDE SERPL-SCNC: 103 MMOL/L (ref 98–107)
CHOLEST SERPL-MCNC: 257 MG/DL (ref 0–199)
CHOLESTEROL/HDL RATIO: 7.8
CO2 SERPL-SCNC: 27 MMOL/L (ref 21–32)
CREAT SERPL-MCNC: 1.18 MG/DL (ref 0.5–1.3)
CREAT UR-MCNC: 100.4 MG/DL (ref 20–370)
EGFRCR SERPLBLD CKD-EPI 2021: 60 ML/MIN/1.73M*2
GLUCOSE SERPL-MCNC: 110 MG/DL (ref 74–99)
HDLC SERPL-MCNC: 33 MG/DL
LDLC SERPL CALC-MCNC: 168 MG/DL
MICROALBUMIN UR-MCNC: 59.3 MG/L
MICROALBUMIN/CREAT UR: 59.1 UG/MG CREAT
NON HDL CHOLESTEROL: 224 MG/DL (ref 0–149)
POTASSIUM SERPL-SCNC: 4.2 MMOL/L (ref 3.5–5.3)
PROT SERPL-MCNC: 7.5 G/DL (ref 6.4–8.2)
SODIUM SERPL-SCNC: 138 MMOL/L (ref 136–145)
T4 FREE SERPL-MCNC: 0.58 NG/DL (ref 0.61–1.12)
TRIGL SERPL-MCNC: 280 MG/DL (ref 0–149)
TSH SERPL-ACNC: 30.27 MIU/L (ref 0.44–3.98)
VLDL: 56 MG/DL (ref 0–40)

## 2025-01-23 PROCEDURE — 80061 LIPID PANEL: CPT

## 2025-01-23 PROCEDURE — 80053 COMPREHEN METABOLIC PANEL: CPT

## 2025-01-23 PROCEDURE — 82043 UR ALBUMIN QUANTITATIVE: CPT

## 2025-01-23 PROCEDURE — 83036 HEMOGLOBIN GLYCOSYLATED A1C: CPT

## 2025-01-23 PROCEDURE — 82570 ASSAY OF URINE CREATININE: CPT

## 2025-01-23 PROCEDURE — 84443 ASSAY THYROID STIM HORMONE: CPT

## 2025-01-23 PROCEDURE — 84439 ASSAY OF FREE THYROXINE: CPT

## 2025-01-23 RX ORDER — LEVOTHYROXINE SODIUM 125 UG/1
125 TABLET ORAL DAILY
Qty: 30 TABLET | Refills: 11 | Status: SHIPPED | OUTPATIENT
Start: 2025-01-23 | End: 2026-01-23

## 2025-01-24 ENCOUNTER — APPOINTMENT (OUTPATIENT)
Dept: PRIMARY CARE | Facility: CLINIC | Age: 89
End: 2025-01-24
Payer: MEDICARE

## 2025-01-24 ENCOUNTER — TELEPHONE (OUTPATIENT)
Dept: PRIMARY CARE | Facility: CLINIC | Age: 89
End: 2025-01-24

## 2025-01-24 VITALS
HEART RATE: 68 BPM | SYSTOLIC BLOOD PRESSURE: 124 MMHG | DIASTOLIC BLOOD PRESSURE: 80 MMHG | BODY MASS INDEX: 20.9 KG/M2 | HEIGHT: 70 IN | WEIGHT: 146 LBS

## 2025-01-24 DIAGNOSIS — E78.5 DYSLIPIDEMIA ASSOCIATED WITH TYPE 2 DIABETES MELLITUS (MULTI): ICD-10-CM

## 2025-01-24 DIAGNOSIS — N40.1 URINARY RETENTION DUE TO BENIGN PROSTATIC HYPERPLASIA: ICD-10-CM

## 2025-01-24 DIAGNOSIS — Z00.00 MEDICARE ANNUAL WELLNESS VISIT, SUBSEQUENT: Primary | ICD-10-CM

## 2025-01-24 DIAGNOSIS — R33.8 URINARY RETENTION DUE TO BENIGN PROSTATIC HYPERPLASIA: ICD-10-CM

## 2025-01-24 DIAGNOSIS — E11.69 DYSLIPIDEMIA ASSOCIATED WITH TYPE 2 DIABETES MELLITUS (MULTI): ICD-10-CM

## 2025-01-24 DIAGNOSIS — E11.9 DIABETES MELLITUS TYPE 2, DIET-CONTROLLED: ICD-10-CM

## 2025-01-24 DIAGNOSIS — E03.9 ACQUIRED HYPOTHYROIDISM: ICD-10-CM

## 2025-01-24 DIAGNOSIS — Z51.81 THERAPEUTIC DRUG MONITORING: ICD-10-CM

## 2025-01-24 LAB
EST. AVERAGE GLUCOSE BLD GHB EST-MCNC: 137 MG/DL
HBA1C MFR BLD: 6.4 %

## 2025-01-24 PROCEDURE — 80324 DRUG SCREEN AMPHETAMINES 1/2: CPT | Mod: MUE | Performed by: INTERNAL MEDICINE

## 2025-01-24 PROCEDURE — 80362 OPIOIDS & OPIATE ANALOGS 1/2: CPT | Mod: MUE | Performed by: INTERNAL MEDICINE

## 2025-01-24 PROCEDURE — 80365 DRUG SCREENING OXYCODONE: CPT | Mod: MUE | Performed by: INTERNAL MEDICINE

## 2025-01-24 PROCEDURE — 80359 METHYLENEDIOXYAMPHETAMINES: CPT | Performed by: INTERNAL MEDICINE

## 2025-01-24 PROCEDURE — 80361 OPIATES 1 OR MORE: CPT | Mod: MUE | Performed by: INTERNAL MEDICINE

## 2025-01-24 PROCEDURE — 80372 DRUG SCREENING TAPENTADOL: CPT | Mod: MUE | Performed by: INTERNAL MEDICINE

## 2025-01-24 PROCEDURE — 80366 DRUG SCREENING PREGABALIN: CPT | Mod: MUE | Performed by: INTERNAL MEDICINE

## 2025-01-24 PROCEDURE — 80323 ALKALOIDS NOS: CPT | Mod: MUE | Performed by: INTERNAL MEDICINE

## 2025-01-24 PROCEDURE — 80349 CANNABINOIDS NATURAL: CPT | Mod: MUE | Performed by: INTERNAL MEDICINE

## 2025-01-24 PROCEDURE — 80373 DRUG SCREENING TRAMADOL: CPT | Mod: MUE | Performed by: INTERNAL MEDICINE

## 2025-01-24 PROCEDURE — 83992 ASSAY FOR PHENCYCLIDINE: CPT | Mod: MUE | Performed by: INTERNAL MEDICINE

## 2025-01-24 PROCEDURE — 80348 DRUG SCREENING BUPRENORPHINE: CPT | Mod: MUE | Performed by: INTERNAL MEDICINE

## 2025-01-24 PROCEDURE — 80353 DRUG SCREENING COCAINE: CPT | Mod: MUE | Performed by: INTERNAL MEDICINE

## 2025-01-24 PROCEDURE — 80354 DRUG SCREENING FENTANYL: CPT | Mod: MUE | Performed by: INTERNAL MEDICINE

## 2025-01-24 PROCEDURE — 80346 BENZODIAZEPINES1-12: CPT | Mod: MUE | Performed by: INTERNAL MEDICINE

## 2025-01-24 PROCEDURE — 80355 GABAPENTIN NON-BLOOD: CPT | Mod: MUE | Performed by: INTERNAL MEDICINE

## 2025-01-24 PROCEDURE — 80367 DRUG SCREENING PROPOXYPHENE: CPT | Mod: MUE | Performed by: INTERNAL MEDICINE

## 2025-01-24 PROCEDURE — 80332 ANTIDEPRESSANTS CLASS 1 OR 2: CPT | Mod: MUE | Performed by: INTERNAL MEDICINE

## 2025-01-24 PROCEDURE — 80358 DRUG SCREENING METHADONE: CPT | Mod: MUE | Performed by: INTERNAL MEDICINE

## 2025-01-24 PROCEDURE — 80368 SEDATIVE HYPNOTICS: CPT | Mod: MUE | Performed by: INTERNAL MEDICINE

## 2025-01-24 ASSESSMENT — PATIENT HEALTH QUESTIONNAIRE - PHQ9
2. FEELING DOWN, DEPRESSED OR HOPELESS: NOT AT ALL
1. LITTLE INTEREST OR PLEASURE IN DOING THINGS: NOT AT ALL
SUM OF ALL RESPONSES TO PHQ9 QUESTIONS 1 AND 2: 0

## 2025-01-24 ASSESSMENT — ACTIVITIES OF DAILY LIVING (ADL)
GROCERY_SHOPPING: INDEPENDENT
MANAGING_FINANCES: INDEPENDENT
BATHING: INDEPENDENT
DOING_HOUSEWORK: INDEPENDENT
TAKING_MEDICATION: INDEPENDENT
DRESSING: INDEPENDENT

## 2025-01-24 ASSESSMENT — ANXIETY QUESTIONNAIRES
2. NOT BEING ABLE TO STOP OR CONTROL WORRYING: NOT AT ALL
1. FEELING NERVOUS, ANXIOUS, OR ON EDGE: NOT AT ALL
4. TROUBLE RELAXING: NOT AT ALL
7. FEELING AFRAID AS IF SOMETHING AWFUL MIGHT HAPPEN: NOT AT ALL
3. WORRYING TOO MUCH ABOUT DIFFERENT THINGS: NOT AT ALL
GAD7 TOTAL SCORE: 0
5. BEING SO RESTLESS THAT IT IS HARD TO SIT STILL: NOT AT ALL
IF YOU CHECKED OFF ANY PROBLEMS ON THIS QUESTIONNAIRE, HOW DIFFICULT HAVE THESE PROBLEMS MADE IT FOR YOU TO DO YOUR WORK, TAKE CARE OF THINGS AT HOME, OR GET ALONG WITH OTHER PEOPLE: NOT DIFFICULT AT ALL
6. BECOMING EASILY ANNOYED OR IRRITABLE: NOT AT ALL

## 2025-01-24 ASSESSMENT — ENCOUNTER SYMPTOMS
OCCASIONAL FEELINGS OF UNSTEADINESS: 0
DEPRESSION: 0
LOSS OF SENSATION IN FEET: 0

## 2025-01-24 NOTE — ASSESSMENT & PLAN NOTE
Reevaluate hemoglobin A1c not requiring medication treatment at this time  Orders:    Follow Up In [facility] - PCP - Medicare Annual    Follow Up In [facility] - PCP - Established; Future    Comprehensive Metabolic Panel; Future    Hemoglobin A1C; Future    Lipid Panel; Future    Albumin-Creatinine Ratio, Urine Random; Future

## 2025-01-24 NOTE — ASSESSMENT & PLAN NOTE
Difficulty with ongoing incontinence but no signs of obstruction or urinary retention after laser prostatectomy for enlarged prostate  Orders:    Follow Up In [facility] - PCP - Medicare Annual

## 2025-01-24 NOTE — ASSESSMENT & PLAN NOTE
Up-to-date with vaccinations and screenings discussed advanced medical directives with grandson's medical power of  follow-up in 6 months

## 2025-01-24 NOTE — PROGRESS NOTES
"Subjective   Reason for Visit: Dale Mcmahan is an 89 y.o. male here for a Medicare Wellness visit.          Reviewed all medications by prescribing practitioner or clinical pharmacist (such as prescriptions, OTCs, herbal therapies and supplements) and documented in the medical record.    HPI    Patient Care Team:  Jackson Zhang DO as PCP - General  Jackson Zhang DO as PCP - [employer] PCP     Review of Systems    Objective   Vitals:  /80   Pulse 68   Ht 1.778 m (5' 10\")   Wt 66.2 kg (146 lb)   BMI 20.95 kg/m²       Physical Exam    Assessment & Plan  Therapeutic drug monitoring    Orders:    Follow Up In [facility] - PCP - Medicare Annual    Dyslipidemia associated with type 2 diabetes mellitus (Multi)    Orders:    Follow Up In [facility] - PCP - Medicare Annual    Acquired hypothyroidism    Orders:    Follow Up In [facility] - PCP - Medicare Annual    Diabetes mellitus type 2, diet-controlled    Orders:    Follow Up In [facility] - PCP - Medicare Annual    Urinary retention due to benign prostatic hyperplasia    Orders:    Follow Up In [facility] - PCP - Medicare Annual              "

## 2025-01-24 NOTE — TELEPHONE ENCOUNTER
Gianni,  I reviewed your lab results and your thyroid level is very low I am recommending that you increase your levothyroxine to 125 mcg 1 daily and we will reevaluate your thyroid function in 4 weeks with labs and new prescription has been called into [facility] please pick it up and start immediately as directed

## 2025-01-24 NOTE — ASSESSMENT & PLAN NOTE
Patient admits to not taking medication will establish pillbox for regular treatment and reevaluate in 6 months repeat thyroid functions in 1 month recently increased from 112 to 125 mcg daily for TSH of 30  Orders:    Follow Up In [facility] - PCP - Medicare Annual    Follow Up In [facility] - PCP - Established; Future    Comprehensive Metabolic Panel; Future    Hemoglobin A1C; Future    Lipid Panel; Future    Albumin-Creatinine Ratio, Urine Random; Future

## 2025-01-24 NOTE — ASSESSMENT & PLAN NOTE
Contract renewed for as needed daily use of oxycodone stable for chronic low back pain NSAID contraindication  Orders:    QUANTISAL ORAL SWAB; [facility]; N/A - Miscellaneous Test; Future    Follow Up In [facility] - PCP - Medicare Annual

## 2025-01-24 NOTE — PROGRESS NOTES
"Subjective   Reason for Visit: Dale Mcmahan is an 89 y.o. male here for a Medicare Wellness visit.     Past Medical, Surgical, and Family History reviewed and updated in chart.    Reviewed all medications by prescribing practitioner or clinical pharmacist (such as prescriptions, OTCs, herbal therapies and supplements) and documented in the medical record.    [facility]    Patient Care Team:  Jackson Zhang DO as PCP - General  Jackson Zhang DO as PCP - [employer] PCP     Review of Systems   All other systems reviewed and are negative.      Objective   Vitals:  /80   Pulse 68   Ht 1.778 m (5' 10\")   Wt 66.2 kg (146 lb)   BMI 20.95 kg/m²       Physical Exam  Vitals and nursing note reviewed.   Constitutional:       General: He is not in acute distress.     Appearance: Normal appearance. He is well-developed. He is not toxic-appearing.   HENT:      Head: Normocephalic and atraumatic.      Right Ear: Tympanic membrane and external ear normal.      Left Ear: Tympanic membrane and external ear normal.      Nose: Nose normal.      Mouth/Throat:      Mouth: Mucous membranes are moist.      Pharynx: Oropharynx is clear. No oropharyngeal exudate or posterior oropharyngeal erythema.      Tonsils: No tonsillar exudate. 2+ on the right. 2+ on the left.   Eyes:      Extraocular Movements: Extraocular movements intact.      Conjunctiva/sclera: Conjunctivae normal.   Cardiovascular:      Rate and Rhythm: Normal rate and regular rhythm.      Pulses: Normal pulses.      Heart sounds: Normal heart sounds. No murmur heard.  Pulmonary:      Effort: Pulmonary effort is normal.      Breath sounds: Normal breath sounds.   Abdominal:      General: Abdomen is flat. Bowel sounds are normal.      Palpations: Abdomen is soft.   Musculoskeletal:      Cervical back: Neck supple.   Feet:      Right foot:      Skin integrity: Skin integrity normal. No ulcer, blister, skin breakdown, erythema, warmth or callus.      " Toenail Condition: Right toenails are normal.      Left foot:      Skin integrity: Skin integrity normal. No ulcer, blister, skin breakdown, erythema, warmth or callus.      Toenail Condition: Left toenails are normal.   Lymphadenopathy:      Cervical: No cervical adenopathy.   Skin:     General: Skin is warm and dry.      Capillary Refill: Capillary refill takes more than 3 seconds.      Findings: No rash.   Neurological:      Mental Status: He is alert. Mental status is at baseline.      Sensory: Sensation is intact.   Psychiatric:         Mood and Affect: Mood normal.         Behavior: Behavior normal.         Thought Content: Thought content normal.         Judgment: Judgment normal.     OARRS:  No data recorded  I have personally reviewed the OARRS report for Dale Mcmahan. I have considered the risks of abuse, dependence, addiction and diversion    Is the patient prescribed a combination of a benzodiazepine and opioid?  No    Last Urine Drug Screen / ordered today: Yes  No results found for this or any previous visit (from the past 8760 hours).  Results are as expected.     Clinical rationale for not completing a Urine Drug Screen: 1/24/25      Controlled Substance Agreement:  Date of the Last Agreement: 1/24/25  Reviewed Controlled Substance Agreement including but not limited to the benefits, risks, and alternatives to treatment with a Controlled Substance medication(s).    Opioids:  What is the patient's goal of therapy? y  Is this being achieved with current treatment? y    I have calculated the patient's Morphine Dose Equivalent (MED):   I have considered referral to Pain Management and/or a specialist, and do not feel it is necessary at this time.    I feel that it is clinically indicated to continue this current medication regimen after consideration of alternative therapies, and other non-opioid treatment.    Opioid Risk Screening:  No data recorded    Pain Assessment:  Analgesia  What was your pain  level on average during the past week?: 5  What was your pain level at its worst during the past week?: 9  What percentage of your pain has been relieved during the past week?: 75 %  Is the amount of pain relief you are now obtaining from your current pain relievers enough to make a real difference in your life?: Y  Query to Clinician: Is the patient's pain relief clinically significant?: Unsure    Activities of Daily Living  Physical Functioning: Same  Family Relationships: Same  Social Relationships: Same  Mood: Same  Sleep Patterns: Better  Overall Functioning: Same    Adverse Events  Is patient experiencing any side effects from current pain relievers?: N  Patient's Overall Severity of Side Effects: None        Assessment & Plan  Therapeutic drug monitoring  Contract renewed for as needed daily use of oxycodone stable for chronic low back pain NSAID contraindication  Orders:    QUANTISAL ORAL SWAB; [facility]; N/A - Miscellaneous Test; Future    Follow Up In [facility] - PCP - Medicare Annual    Dyslipidemia associated with type 2 diabetes mellitus (Multi)  Reevaluate lipid profile 6 months with reinitiation of pravastatin 40 mg daily and levothyroxine 125 mcg daily  Orders:    Follow Up In [facility] - PCP - Medicare Annual    Acquired hypothyroidism  Patient admits to not taking medication will establish pillbox for regular treatment and reevaluate in 6 months repeat thyroid functions in 1 month recently increased from 112 to 125 mcg daily for TSH of 30  Orders:    Follow Up In [facility] - PCP - Medicare Annual    Follow Up In [facility] Established; Future    Comprehensive Metabolic Panel; Future    Hemoglobin A1C; Future    Lipid Panel; Future    Albumin-Creatinine Ratio, Urine Random; Future    Diabetes mellitus type 2, diet-controlled  Reevaluate hemoglobin A1c not requiring medication treatment at this time  Orders:    Follow Up In [facility] - Medicare Annual    Follow Up In [facility] - PCP - Established; Future    Comprehensive Metabolic Panel; Future    Hemoglobin A1C; Future    Lipid Panel; Future    Albumin-Creatinine Ratio, Urine Random; Future    Urinary retention due to benign prostatic hyperplasia  Difficulty with ongoing incontinence but no signs of obstruction or urinary retention after laser prostatectomy for enlarged prostate  Orders:    Follow Up In [facility] - PCP - Medicare Annual    Medicare annual wellness visit, subsequent  Up-to-date with vaccinations and screenings discussed advanced medical directives with grandson's medical power of  follow-up in 6 months              Advance Directives Discussion  16 - 20 minutes were spent discussing Advanced Care Planning (including a Living Will, Medical Power Of , as well as specific end of life choices and/or directives). The details of that discussion were documented in Advanced Directives Discussion section of the medical record.     Cardiac Risk Assessment  15 - 20 minutes were spent discussing Cardiovascular risk and, if needed, lifestyle modifications were recommended, including nutritional choices, exercise, and elimination of habits contributing to risk.   Aspirin use/disuse was discussed following the guidelines below:  low dose ASA ( mg) should be considered:    If prior Heart Attack/Stroke/Peripheral vascular disease:  Generally recommend daily low dose aspirin unless extremely high bleeding risk (e.g., gastrointestinal).    If no prior Heart Attack/Stroke/Peripheral vascular disease:              Age over 70: Do not use Aspirin for prevention    Age less than 70 and 10-year cardiovascular disease risk is >20%: use low dose Aspirin for prevention.                 Depression Screening  5 - 10 minutes were spent screening for depression.

## 2025-01-24 NOTE — ASSESSMENT & PLAN NOTE
Reevaluate lipid profile 6 months with reinitiation of pravastatin 40 mg daily and levothyroxine 125 mcg daily  Orders:    Follow Up In [facility] - PCP - Medicare Annual

## 2025-02-03 LAB — SCAN RESULT: NORMAL

## 2025-02-17 ENCOUNTER — TELEPHONE (OUTPATIENT)
Dept: PRIMARY CARE | Facility: CLINIC | Age: 89
End: 2025-02-17
Payer: MEDICARE

## 2025-02-17 DIAGNOSIS — G89.29 OTHER CHRONIC BACK PAIN: ICD-10-CM

## 2025-02-17 DIAGNOSIS — M54.89 OTHER CHRONIC BACK PAIN: ICD-10-CM

## 2025-02-17 RX ORDER — OXYCODONE AND ACETAMINOPHEN 5; 325 MG/1; MG/1
1 TABLET ORAL DAILY
Qty: 30 TABLET | Refills: 0 | Status: SHIPPED | OUTPATIENT
Start: 2025-02-17

## 2025-02-17 NOTE — TELEPHONE ENCOUNTER
Patient also needs a refill  oxyCODONE-acetaminophen (Percocet) 5-325 mg tablet [[id number]]    Order Details  Dose: 1 tablet Route: oral Frequency: Daily   Dispense Quantity: 30 tablet Refills: 0             Sig: Take 1 tablet by mouth once daily.           Start Date: 12/13/24 End Date: --   Written Date: 12/13/24 Rx Expiration Date: 06/11/25    Pharmacy     [address] - [address] [place] [address]

## 2025-02-17 NOTE — TELEPHONE ENCOUNTER
"[facility] patients grandson called stating that his grandpa was sick and is feeling better now. He still seems to be \"off\" his balance is bad. The nurse that visits said that his memory is off. They couldn't talk him in to the ER. Philomena says he's just not acting himself. He is wondering about his sugar.  Please advise  Sched 7/24    Patient also needs a refill  oxyCODONE-acetaminophen (Percocet) 5-325 mg tablet [[id number]]    Order Details  Dose: 1 tablet Route: oral Frequency: Daily   Dispense Quantity: 30 tablet Refills: 0          Sig: Take 1 tablet by mouth once daily.         Start Date: 12/13/24 End Date: --   Written Date: 12/13/24 Rx Expiration Date: 06/11/25    Pharmacy    [facility], [place] - [address] [facility]     "

## 2025-03-31 ENCOUNTER — TELEPHONE (OUTPATIENT)
Dept: PRIMARY CARE | Facility: CLINIC | Age: 89
End: 2025-03-31
Payer: MEDICARE

## 2025-03-31 DIAGNOSIS — M54.89 OTHER CHRONIC BACK PAIN: ICD-10-CM

## 2025-03-31 DIAGNOSIS — G89.29 OTHER CHRONIC BACK PAIN: ICD-10-CM

## 2025-03-31 RX ORDER — OXYCODONE AND ACETAMINOPHEN 5; 325 MG/1; MG/1
1 TABLET ORAL DAILY
Qty: 30 TABLET | Refills: 0 | Status: SHIPPED | OUTPATIENT
Start: 2025-03-31

## 2025-03-31 NOTE — TELEPHONE ENCOUNTER
Med Refill   oxyCODONE-acetaminophen (Percocet) 5-325 mg tablet [[id number]]     [facility]  - [place], [place] - [id number] [facility] [address]  Phone: [phone]  Fax: [phone]  DMITRY #: --

## 2025-05-09 ENCOUNTER — TELEPHONE (OUTPATIENT)
Dept: PRIMARY CARE | Facility: CLINIC | Age: 89
End: 2025-05-09
Payer: MEDICARE

## 2025-05-09 DIAGNOSIS — G89.29 OTHER CHRONIC BACK PAIN: ICD-10-CM

## 2025-05-09 DIAGNOSIS — M54.89 OTHER CHRONIC BACK PAIN: ICD-10-CM

## 2025-05-09 RX ORDER — OXYCODONE AND ACETAMINOPHEN 5; 325 MG/1; MG/1
1 TABLET ORAL DAILY
Qty: 30 TABLET | Refills: 0 | Status: SHIPPED | OUTPATIENT
Start: 2025-05-09

## 2025-05-09 NOTE — TELEPHONE ENCOUNTER
Refill on Oxycodone-acetaminophen  5/325 mg   1 tablet once daily    To [facility]  Last OV: 1-24-25  Next OV: 7-24-25

## 2025-06-26 ENCOUNTER — TELEPHONE (OUTPATIENT)
Dept: PRIMARY CARE | Facility: CLINIC | Age: 89
End: 2025-06-26
Payer: MEDICARE

## 2025-06-26 DIAGNOSIS — M54.89 OTHER CHRONIC BACK PAIN: ICD-10-CM

## 2025-06-26 DIAGNOSIS — G89.29 OTHER CHRONIC BACK PAIN: ICD-10-CM

## 2025-06-26 RX ORDER — OXYCODONE AND ACETAMINOPHEN 5; 325 MG/1; MG/1
1 TABLET ORAL DAILY
Qty: 30 TABLET | Refills: 0 | Status: SHIPPED | OUTPATIENT
Start: 2025-06-26

## 2025-06-26 NOTE — TELEPHONE ENCOUNTER
Med Refill  oxyCODONE-acetaminophen (Percocet) 5-325 mg tablet [[id number]]     [facility]  - [place], [place] - [id number] [facility] [address]  Phone: [phone]  Fax: [phone]  DMITRY #: --

## 2025-06-26 NOTE — TELEPHONE ENCOUNTER
Requested Prescriptions     Pending Prescriptions Disp Refills    oxyCODONE-acetaminophen (Percocet) 5-325 mg tablet 30 tablet 0     Sig: Take 1 tablet by mouth once daily.     Lov 1/24/25    Nov 7/24/25    Uds 1/24/25

## 2025-07-24 ENCOUNTER — APPOINTMENT (OUTPATIENT)
Dept: PRIMARY CARE | Facility: CLINIC | Age: 89
End: 2025-07-24
Payer: MEDICARE

## 2025-07-24 VITALS
WEIGHT: 155 LBS | HEART RATE: 66 BPM | SYSTOLIC BLOOD PRESSURE: 124 MMHG | HEIGHT: 70 IN | BODY MASS INDEX: 22.19 KG/M2 | DIASTOLIC BLOOD PRESSURE: 70 MMHG

## 2025-07-24 DIAGNOSIS — R91.8 LUNG NODULES: ICD-10-CM

## 2025-07-24 DIAGNOSIS — E03.9 ACQUIRED HYPOTHYROIDISM: ICD-10-CM

## 2025-07-24 DIAGNOSIS — R33.8 URINARY RETENTION DUE TO BENIGN PROSTATIC HYPERPLASIA: Primary | ICD-10-CM

## 2025-07-24 DIAGNOSIS — N40.1 URINARY RETENTION DUE TO BENIGN PROSTATIC HYPERPLASIA: Primary | ICD-10-CM

## 2025-07-24 DIAGNOSIS — J43.2 CENTRILOBULAR EMPHYSEMA (MULTI): ICD-10-CM

## 2025-07-24 DIAGNOSIS — E11.9 DIABETES MELLITUS TYPE 2, DIET-CONTROLLED: ICD-10-CM

## 2025-07-24 DIAGNOSIS — Z51.81 THERAPEUTIC DRUG MONITORING: ICD-10-CM

## 2025-07-24 PROCEDURE — G2211 COMPLEX E/M VISIT ADD ON: HCPCS | Performed by: INTERNAL MEDICINE

## 2025-07-24 PROCEDURE — G0009 ADMIN PNEUMOCOCCAL VACCINE: HCPCS | Performed by: INTERNAL MEDICINE

## 2025-07-24 PROCEDURE — 99214 OFFICE O/P EST MOD 30 MIN: CPT | Performed by: INTERNAL MEDICINE

## 2025-07-24 PROCEDURE — 90677 PCV20 VACCINE IM: CPT | Performed by: INTERNAL MEDICINE

## 2025-07-24 PROCEDURE — 1160F RVW MEDS BY RX/DR IN RCRD: CPT | Performed by: INTERNAL MEDICINE

## 2025-07-24 PROCEDURE — 1159F MED LIST DOCD IN RCRD: CPT | Performed by: INTERNAL MEDICINE

## 2025-07-24 RX ORDER — ALBUTEROL SULFATE 90 UG/1
2 INHALANT RESPIRATORY (INHALATION) EVERY 4 HOURS PRN
Qty: 8 G | Refills: 5 | Status: SHIPPED | OUTPATIENT
Start: 2025-07-24 | End: 2026-07-24

## 2025-07-24 NOTE — PROGRESS NOTES
"Subjective   Patient ID: Dale Mcmahan is a 89 y.o. male who presents for Follow-up.    HPI     Review of Systems    Objective   /70   Pulse 66   Ht 1.778 m (5' 10\")   Wt 70.3 kg (155 lb)   BMI 22.24 kg/m²     Physical Exam    Assessment/Plan          "

## 2025-07-24 NOTE — ASSESSMENT & PLAN NOTE
Diet controlled with hemoglobin A1c 6.4 reevaluate with next visit  Orders:    Follow Up In [facility] - PCP - Established    Comprehensive Metabolic Panel; Future    Hemoglobin A1C; Future    Lipid Panel; Future    Albumin-Creatinine Ratio, Urine Random; Future    Tsh With Reflex To Free T4 If Abnormal; Future    Urinalysis with Reflex Microscopic; Future    PSA; Future    CBC and Auto Differential; Future

## 2025-07-24 NOTE — PROGRESS NOTES
Subjective   Dale Mcmahan is a 89 y.o. male who presents for Follow-up.  HPI  This is a diabetes follow up visit for Dale Mcmahan. The current treatment includes diet and he is compliant all of the time. Symptoms include none. Glucose is monitored: not checking. he reports good compliance with a low carbohydrate diet, and exercises daily.    Patient is not prescribed ACE/ARB/ARNI medication   Patient is prescribed a STATIN medication  Patient is not prescribed a SGLT2/GLP1 medication    Last Flu Vaccine given:            09/24/2024   Last PCV Vaccine given:   12/11/2014    Lab Results   Component Value Date    HGBA1C 6.4 (H) 01/23/2025    CREATININE 1.18 01/23/2025    MICROALBCREA 59.1 (H) 01/23/2025    LDLCALC 168 (H) 01/23/2025        Last Eye Exam: patient reports annual screening by optometry/opthalmology     Liver Screening: Computed FIB-4 Calculation unavailable. One or more values for this score either were not found within the given timeframe or did not fit some other criterion.    Interpretation: <1.45 Cirrhosis less likely, 1.45 - 3.25 Indeterminate, >3.25 Cirrhosis more likely    Review of Systems   All other systems reviewed and are negative.    Visit Vitals  /70   Pulse 66   Body mass index is 22.24 kg/m².   Objective   Physical Exam  Vitals and nursing note reviewed.   Constitutional:       General: He is not in acute distress.     Appearance: Normal appearance. He is well-developed. He is not toxic-appearing.   HENT:      Head: Normocephalic and atraumatic.      Right Ear: Tympanic membrane and external ear normal.      Left Ear: Tympanic membrane and external ear normal.      Nose: Nose normal.      Mouth/Throat:      Mouth: Mucous membranes are moist.      Pharynx: Oropharynx is clear. No oropharyngeal exudate or posterior oropharyngeal erythema.      Tonsils: No tonsillar exudate. 2+ on the right. 2+ on the left.     Eyes:      Extraocular Movements: Extraocular movements intact.       Conjunctiva/sclera: Conjunctivae normal.       Cardiovascular:      Rate and Rhythm: Normal rate and regular rhythm.      Pulses: Normal pulses.      Heart sounds: Normal heart sounds. No murmur heard.  Pulmonary:      Effort: Pulmonary effort is normal.      Breath sounds: Normal breath sounds.   Abdominal:      General: Abdomen is flat. Bowel sounds are normal.      Palpations: Abdomen is soft.     Musculoskeletal:      Cervical back: Neck supple.   Feet:      Right foot:      Skin integrity: Skin integrity normal. No ulcer, blister, skin breakdown, erythema, warmth or callus.      Toenail Condition: Right toenails are normal.      Left foot:      Skin integrity: Skin integrity normal. No ulcer, blister, skin breakdown, erythema, warmth or callus.      Toenail Condition: Left toenails are normal.   Lymphadenopathy:      Cervical: No cervical adenopathy.     Skin:     General: Skin is warm and dry.      Capillary Refill: Capillary refill takes more than 3 seconds.      Findings: No rash.     Neurological:      Mental Status: He is alert. Mental status is at baseline.      Sensory: Sensation is intact.     Psychiatric:         Mood and Affect: Mood normal.         Behavior: Behavior normal.         Thought Content: Thought content normal.         Judgment: Judgment normal.     OARRS:  No data recorded  I have personally reviewed the OARRS report for Dale WEEKS Martir. I have considered the risks of abuse, dependence, addiction and diversion    Is the patient prescribed a combination of a benzodiazepine and opioid?  No    Last Urine Drug Screen / ordered today: Yes  No results found for this or any previous visit (from the past 8760 hours).  Results are as expected.     Clinical rationale for not completing a Urine Drug Screen: 1/24/25      Controlled Substance Agreement:  Date of the Last Agreement: 1/24/25  Reviewed Controlled Substance Agreement including but not limited to the benefits, risks, and alternatives to  treatment with a Controlled Substance medication(s).    Opioids:  What is the patient's goal of therapy? y  Is this being achieved with current treatment? y    I have calculated the patient's Morphine Dose Equivalent (MED):   I have considered referral to Pain Management and/or a specialist, and do not feel it is necessary at this time.    I feel that it is clinically indicated to continue this current medication regimen after consideration of alternative therapies, and other non-opioid treatment.    Opioid Risk Screening:  No data recorded    Pain Assessment:  Analgesia  What was your pain level on average during the past week?: 7  What was your pain level at its worst during the past week?: 9  What percentage of your pain has been relieved during the past week?: 90 %  Is the amount of pain relief you are now obtaining from your current pain relievers enough to make a real difference in your life?: Y  Query to Clinician: Is the patient's pain relief clinically significant?: Unsure    Activities of Daily Living  Physical Functioning: Same  Family Relationships: Same  Social Relationships: Same  Mood: Same  Sleep Patterns: Same  Overall Functioning: Same    Adverse Events  Is patient experiencing any side effects from current pain relievers?: N  Patient's Overall Severity of Side Effects: None        Normal BILATERAL diabetic foot exam: Pulses are palpable, sensation is intact, and there are no ulcers or lesions.     Assessment & Plan  Acquired hypothyroidism  Reevaluate thyroid function on levothyroxine 125 mcg daily  Orders:    Follow Up In [facility] - PCP - Established    Comprehensive Metabolic Panel; Future    Hemoglobin A1C; Future    Lipid Panel; Future    Albumin-Creatinine Ratio, Urine Random; Future    Tsh With Reflex To Free T4 If Abnormal; Future    Urinalysis with Reflex Microscopic; Future    PSA; Future    CBC and Auto Differential; Future    Diabetes mellitus type 2, diet-controlled  Diet  controlled with hemoglobin A1c 6.4 reevaluate with next visit  Orders:    Follow Up In [facility] - PCP - Established    Comprehensive Metabolic Panel; Future    Hemoglobin A1C; Future    Lipid Panel; Future    Albumin-Creatinine Ratio, Urine Random; Future    Tsh With Reflex To Free T4 If Abnormal; Future    Urinalysis with Reflex Microscopic; Future    PSA; Future    CBC and Auto Differential; Future    Urinary retention due to benign prostatic hyperplasia  Patient would like referral to another urologist for definitive treatment of chronic urinary retention may benefit from self catheterization review indications for procedural improvement  Orders:    Referral to Urology; Future    Comprehensive Metabolic Panel; Future    Hemoglobin A1C; Future    Lipid Panel; Future    Albumin-Creatinine Ratio, Urine Random; Future    Tsh With Reflex To Free T4 If Abnormal; Future    Urinalysis with Reflex Microscopic; Future    PSA; Future    CBC and Auto Differential; Future    Follow Up [facility] - PCP - Established; Future    Therapeutic drug monitoring  Compliant with regular follow-up and use of once a day oxycodone  Orders:    Comprehensive Metabolic Panel; Future    Hemoglobin A1C; Future    Lipid Panel; Future    Albumin-Creatinine Ratio, Urine Random; Future    Tsh With Reflex To Free T4 If Abnormal; Future    Urinalysis with Reflex Microscopic; Future    PSA; Future    CBC and Auto Differential; Future    Lung nodules  With history of emphysema and reported history of shortness of breath reevaluate lung CT  Orders:    CT chest wo IV contrast; Future    Centrilobular emphysema (Multi)  Refill albuterol  Orders:    albuterol (Ventolin HFA) 90 mcg/actuation inhaler; Inhale 2 puffs every 4 hours if needed for wheezing or shortness of breath.              Jackson Zhang DO 07/24/25 11:16 AM

## 2025-07-24 NOTE — ASSESSMENT & PLAN NOTE
Compliant with regular follow-up and use of once a day oxycodone  Orders:    Comprehensive Metabolic Panel; Future    Hemoglobin A1C; Future    Lipid Panel; Future    Albumin-Creatinine Ratio, Urine Random; Future    Tsh With Reflex To Free T4 If Abnormal; Future    Urinalysis with Reflex Microscopic; Future    PSA; Future    CBC and Auto Differential; Future

## 2025-07-24 NOTE — ASSESSMENT & PLAN NOTE
Patient would like referral to another urologist for definitive treatment of chronic urinary retention may benefit from self catheterization review indications for procedural improvement  Orders:    Referral to Urology; Future    Comprehensive Metabolic Panel; Future    Hemoglobin A1C; Future    Lipid Panel; Future    Albumin-Creatinine Ratio, Urine Random; Future    Tsh With Reflex To Free T4 If Abnormal; Future    Urinalysis with Reflex Microscopic; Future    PSA; Future    CBC and Auto Differential; Future    Follow Up In [facility] - PCP - Established; Future

## 2025-07-24 NOTE — ASSESSMENT & PLAN NOTE
With history of emphysema and reported history of shortness of breath reevaluate lung CT  Orders:    CT chest wo IV contrast; Future

## 2025-07-24 NOTE — ASSESSMENT & PLAN NOTE
Reevaluate thyroid function on levothyroxine 125 mcg daily  Orders:    Follow Up In [facility] - PCP - Established    Comprehensive Metabolic Panel; Future    Hemoglobin A1C; Future    Lipid Panel; Future    Albumin-Creatinine Ratio, Urine Random; Future    Tsh With Reflex To Free T4 If Abnormal; Future    Urinalysis with Reflex Microscopic; Future    PSA; Future    CBC and Auto Differential; Future

## 2025-07-26 LAB
ALBUMIN SERPL-MCNC: 4.1 G/DL (ref 3.6–5.1)
ALBUMIN/CREAT UR: 44 MG/G CREAT
ALP SERPL-CCNC: 72 U/L (ref 35–144)
ALT SERPL-CCNC: 16 U/L (ref 9–46)
ANION GAP SERPL CALCULATED.4IONS-SCNC: 8 MMOL/L (CALC) (ref 7–17)
APPEARANCE UR: ABNORMAL
AST SERPL-CCNC: 17 U/L (ref 10–35)
BACTERIA #/AREA URNS HPF: ABNORMAL /HPF
BASOPHILS # BLD AUTO: 113 CELLS/UL (ref 0–200)
BASOPHILS NFR BLD AUTO: 1.2 %
BILIRUB SERPL-MCNC: 0.6 MG/DL (ref 0.2–1.2)
BILIRUB UR QL STRIP: NEGATIVE
BUN SERPL-MCNC: 22 MG/DL (ref 7–25)
CALCIUM SERPL-MCNC: 8.9 MG/DL (ref 8.6–10.3)
CHLORIDE SERPL-SCNC: 107 MMOL/L (ref 98–110)
CHOLEST SERPL-MCNC: 206 MG/DL
CHOLEST/HDLC SERPL: 6.4 (CALC)
CO2 SERPL-SCNC: 25 MMOL/L (ref 20–32)
COLOR UR: YELLOW
CREAT SERPL-MCNC: 1.3 MG/DL (ref 0.7–1.22)
CREAT UR-MCNC: 89 MG/DL (ref 20–320)
EGFRCR SERPLBLD CKD-EPI 2021: 53 ML/MIN/1.73M2
EOSINOPHIL # BLD AUTO: 301 CELLS/UL (ref 15–500)
EOSINOPHIL NFR BLD AUTO: 3.2 %
ERYTHROCYTE [DISTWIDTH] IN BLOOD BY AUTOMATED COUNT: 12.7 % (ref 11–15)
EST. AVERAGE GLUCOSE BLD GHB EST-MCNC: 148 MG/DL
EST. AVERAGE GLUCOSE BLD GHB EST-SCNC: 8.2 MMOL/L
GLUCOSE SERPL-MCNC: 119 MG/DL (ref 65–139)
GLUCOSE UR QL STRIP: NEGATIVE
HBA1C MFR BLD: 6.8 %
HCT VFR BLD AUTO: 46.8 % (ref 38.5–50)
HDLC SERPL-MCNC: 32 MG/DL
HGB BLD-MCNC: 15.4 G/DL (ref 13.2–17.1)
HGB UR QL STRIP: ABNORMAL
HYALINE CASTS #/AREA URNS LPF: ABNORMAL /LPF
KETONES UR QL STRIP: NEGATIVE
LDLC SERPL CALC-MCNC: 140 MG/DL (CALC)
LEUKOCYTE ESTERASE UR QL STRIP: ABNORMAL
LYMPHOCYTES # BLD AUTO: 2228 CELLS/UL (ref 850–3900)
LYMPHOCYTES NFR BLD AUTO: 23.7 %
MCH RBC QN AUTO: 30.7 PG (ref 27–33)
MCHC RBC AUTO-ENTMCNC: 32.9 G/DL (ref 32–36)
MCV RBC AUTO: 93.2 FL (ref 80–100)
MICROALBUMIN UR-MCNC: 3.9 MG/DL
MONOCYTES # BLD AUTO: 724 CELLS/UL (ref 200–950)
MONOCYTES NFR BLD AUTO: 7.7 %
NEUTROPHILS # BLD AUTO: 6035 CELLS/UL (ref 1500–7800)
NEUTROPHILS NFR BLD AUTO: 64.2 %
NITRITE UR QL STRIP: POSITIVE
NONHDLC SERPL-MCNC: 174 MG/DL (CALC)
PH UR STRIP: 6 [PH] (ref 5–8)
PLATELET # BLD AUTO: 315 THOUSAND/UL (ref 140–400)
PMV BLD REES-ECKER: 9.9 FL (ref 7.5–12.5)
POTASSIUM SERPL-SCNC: 4.3 MMOL/L (ref 3.5–5.3)
PROT SERPL-MCNC: 7.9 G/DL (ref 6.1–8.1)
PROT UR QL STRIP: ABNORMAL
PSA SERPL-MCNC: 6.57 NG/ML
RBC # BLD AUTO: 5.02 MILLION/UL (ref 4.2–5.8)
RBC #/AREA URNS HPF: ABNORMAL /HPF
SERVICE CMNT-IMP: ABNORMAL
SODIUM SERPL-SCNC: 140 MMOL/L (ref 135–146)
SP GR UR STRIP: 1.01 (ref 1–1.03)
SQUAMOUS #/AREA URNS HPF: ABNORMAL /HPF
T4 FREE SERPL-MCNC: 0.9 NG/DL (ref 0.8–1.8)
TRIGL SERPL-MCNC: 207 MG/DL
TSH SERPL-ACNC: 18.74 MIU/L (ref 0.4–4.5)
WBC # BLD AUTO: 9.4 THOUSAND/UL (ref 3.8–10.8)
WBC #/AREA URNS HPF: ABNORMAL /HPF

## 2025-07-28 ENCOUNTER — RESULTS FOLLOW-UP (OUTPATIENT)
Dept: PRIMARY CARE | Facility: CLINIC | Age: 89
End: 2025-07-28
Payer: MEDICARE

## 2025-07-28 DIAGNOSIS — N30.00 ACUTE CYSTITIS WITHOUT HEMATURIA: Primary | ICD-10-CM

## 2025-07-28 DIAGNOSIS — E03.9 ACQUIRED HYPOTHYROIDISM: ICD-10-CM

## 2025-07-28 RX ORDER — LEVOTHYROXINE SODIUM 150 UG/1
150 TABLET ORAL DAILY
Qty: 30 TABLET | Refills: 11 | Status: SHIPPED | OUTPATIENT
Start: 2025-07-28 | End: 2026-07-28

## 2025-07-28 RX ORDER — CEFDINIR 300 MG/1
300 CAPSULE ORAL 2 TIMES DAILY
Qty: 10 CAPSULE | Refills: 0 | Status: SHIPPED | OUTPATIENT
Start: 2025-07-28 | End: 2025-08-02

## 2025-07-29 ENCOUNTER — TELEPHONE (OUTPATIENT)
Dept: PRIMARY CARE | Facility: CLINIC | Age: 89
End: 2025-07-29
Payer: MEDICARE

## 2025-07-29 NOTE — TELEPHONE ENCOUNTER
Jackson Zhang, DO to Me  (Selected Message)      7/28/25  6:39 PM  Result Note  I reviewed the results of your lab work  .  Your thyroid level is low on levothyroxine 125 mcg daily and it appears you have a urinary tract infection.  I will treat the urinary tract infection with antibiotic cefdinir 300 mg twice a day for 5 days I will also increase your levothyroxine from 125 to 150 mcg daily please repeat your blood work for me in 4 weeks

## 2025-07-29 NOTE — TELEPHONE ENCOUNTER
----- Message from Jackson Zhang sent at 7/28/2025  6:39 PM EDT -----  I reviewed the results of your lab work  .  Your thyroid level is low on levothyroxine 125 mcg daily and it appears you have a urinary tract infection.  I will treat the urinary tract infection with   antibiotic cefdinir 300 mg twice a day for 5 days I will also increase your levothyroxine from 125 to 150 mcg daily please repeat your blood work for me in 4 weeks  ----- Message -----  From: Varun Embanet Results In  Sent: 7/26/2025   1:51 AM EDT  To: Jackson Zhang, DO

## 2025-08-11 ENCOUNTER — HOSPITAL ENCOUNTER (OUTPATIENT)
Dept: RADIOLOGY | Facility: HOSPITAL | Age: 89
Discharge: HOME | End: 2025-08-11
Payer: MEDICARE

## 2025-08-11 DIAGNOSIS — R91.8 LUNG NODULES: ICD-10-CM

## 2025-08-11 PROCEDURE — 71250 CT THORAX DX C-: CPT | Performed by: RADIOLOGY

## 2025-08-11 PROCEDURE — 71250 CT THORAX DX C-: CPT

## 2025-08-13 ENCOUNTER — TELEPHONE (OUTPATIENT)
Dept: PRIMARY CARE | Facility: CLINIC | Age: 89
End: 2025-08-13
Payer: MEDICARE

## 2025-08-13 DIAGNOSIS — G89.29 OTHER CHRONIC BACK PAIN: ICD-10-CM

## 2025-08-13 DIAGNOSIS — M54.89 OTHER CHRONIC BACK PAIN: ICD-10-CM

## 2025-08-13 RX ORDER — OXYCODONE AND ACETAMINOPHEN 5; 325 MG/1; MG/1
1 TABLET ORAL DAILY
Qty: 30 TABLET | Refills: 0 | Status: SHIPPED | OUTPATIENT
Start: 2025-08-13

## 2025-08-14 ENCOUNTER — APPOINTMENT (OUTPATIENT)
Dept: UROLOGY | Facility: CLINIC | Age: 89
End: 2025-08-14
Payer: MEDICARE

## 2025-08-25 DIAGNOSIS — E78.5 DYSLIPIDEMIA, GOAL LDL BELOW 100: ICD-10-CM

## 2025-08-26 ENCOUNTER — OFFICE VISIT (OUTPATIENT)
Dept: SURGERY | Facility: CLINIC | Age: 89
End: 2025-08-26
Payer: MEDICARE

## 2025-08-26 VITALS
HEIGHT: 70 IN | WEIGHT: 153 LBS | DIASTOLIC BLOOD PRESSURE: 74 MMHG | HEART RATE: 70 BPM | BODY MASS INDEX: 21.9 KG/M2 | SYSTOLIC BLOOD PRESSURE: 156 MMHG | TEMPERATURE: 97.2 F

## 2025-08-26 DIAGNOSIS — R91.8 LUNG NODULES: ICD-10-CM

## 2025-08-26 DIAGNOSIS — D49.9 NEOPLASM OF UNSPECIFIED BEHAVIOR OF UNSPECIFIED SITE: ICD-10-CM

## 2025-08-26 DIAGNOSIS — D38.1 NEOPLASM OF UNCERTAIN BEHAVIOR OF LUNG: ICD-10-CM

## 2025-08-26 PROCEDURE — 99205 OFFICE O/P NEW HI 60 MIN: CPT | Performed by: STUDENT IN AN ORGANIZED HEALTH CARE EDUCATION/TRAINING PROGRAM

## 2025-08-26 PROCEDURE — 99215 OFFICE O/P EST HI 40 MIN: CPT | Performed by: STUDENT IN AN ORGANIZED HEALTH CARE EDUCATION/TRAINING PROGRAM

## 2025-08-26 PROCEDURE — 1159F MED LIST DOCD IN RCRD: CPT | Performed by: STUDENT IN AN ORGANIZED HEALTH CARE EDUCATION/TRAINING PROGRAM

## 2025-08-26 RX ORDER — PRAVASTATIN SODIUM 40 MG/1
40 TABLET ORAL NIGHTLY
Qty: 90 TABLET | Refills: 3 | Status: SHIPPED | OUTPATIENT
Start: 2025-08-26

## 2025-08-27 LAB
ANION GAP SERPL CALCULATED.4IONS-SCNC: 11 MMOL/L (CALC) (ref 7–17)
BUN SERPL-MCNC: 22 MG/DL (ref 7–25)
BUN/CREAT SERPL: 12 (CALC) (ref 6–22)
CALCIUM SERPL-MCNC: 9.1 MG/DL (ref 8.6–10.3)
CHLORIDE SERPL-SCNC: 101 MMOL/L (ref 98–110)
CO2 SERPL-SCNC: 27 MMOL/L (ref 20–32)
CREAT SERPL-MCNC: 1.87 MG/DL (ref 0.7–1.22)
EGFRCR SERPLBLD CKD-EPI 2021: 34 ML/MIN/1.73M2
ERYTHROCYTE [DISTWIDTH] IN BLOOD BY AUTOMATED COUNT: 12.4 % (ref 11–15)
GLUCOSE SERPL-MCNC: 115 MG/DL (ref 65–99)
HCT VFR BLD AUTO: 45.1 % (ref 38.5–50)
HGB BLD-MCNC: 14.8 G/DL (ref 13.2–17.1)
INR PPP: 1
MCH RBC QN AUTO: 30.6 PG (ref 27–33)
MCHC RBC AUTO-ENTMCNC: 32.8 G/DL (ref 32–36)
MCV RBC AUTO: 93.2 FL (ref 80–100)
PLATELET # BLD AUTO: 327 THOUSAND/UL (ref 140–400)
PMV BLD REES-ECKER: 9.7 FL (ref 7.5–12.5)
POTASSIUM SERPL-SCNC: 4 MMOL/L (ref 3.5–5.3)
PROTHROMBIN TIME: 10.7 SEC (ref 9–11.5)
RBC # BLD AUTO: 4.84 MILLION/UL (ref 4.2–5.8)
SODIUM SERPL-SCNC: 139 MMOL/L (ref 135–146)
WBC # BLD AUTO: 9.7 THOUSAND/UL (ref 3.8–10.8)

## 2025-09-05 ENCOUNTER — HOSPITAL ENCOUNTER (OUTPATIENT)
Dept: RADIOLOGY | Facility: HOSPITAL | Age: 89
Discharge: HOME | End: 2025-09-05
Payer: MEDICARE

## 2025-09-05 DIAGNOSIS — D38.1 NEOPLASM OF UNCERTAIN BEHAVIOR OF LUNG: ICD-10-CM

## 2025-09-05 DIAGNOSIS — R91.8 LUNG NODULES: ICD-10-CM

## 2025-09-05 PROCEDURE — 78815 PET IMAGE W/CT SKULL-THIGH: CPT | Mod: PI

## 2025-09-05 PROCEDURE — 3430000001 HC RX 343 DIAGNOSTIC RADIOPHARMACEUTICALS: Performed by: STUDENT IN AN ORGANIZED HEALTH CARE EDUCATION/TRAINING PROGRAM

## 2025-09-05 PROCEDURE — A9552 F18 FDG: HCPCS | Performed by: STUDENT IN AN ORGANIZED HEALTH CARE EDUCATION/TRAINING PROGRAM

## 2025-09-05 RX ORDER — FLUDEOXYGLUCOSE F 18 200 MCI/ML
13 INJECTION, SOLUTION INTRAVENOUS
Status: COMPLETED | OUTPATIENT
Start: 2025-09-05 | End: 2025-09-05

## 2025-09-05 RX ADMIN — FLUDEOXYGLUCOSE F 18 13 MILLICURIE: 200 INJECTION, SOLUTION INTRAVENOUS at 08:10

## 2025-11-17 ENCOUNTER — APPOINTMENT (OUTPATIENT)
Dept: PRIMARY CARE | Facility: CLINIC | Age: 89
End: 2025-11-17
Payer: MEDICARE

## (undated) DEVICE — PREP TRAY, VAGINAL

## (undated) DEVICE — SYRINGE, 50 CC, IRRIGATION, CATHETER TIP, DISPOSABLE, STERILE, LF

## (undated) DEVICE — BAG, DRAINAGE, ANTI-REFLUX CHAMBER, 2000ML

## (undated) DEVICE — Device

## (undated) DEVICE — CATHETER, URETHRAL, FOLEY, 2 WAY, BARDEX IC, 22 FR, 30 CC, SILVER, LATEX

## (undated) DEVICE — GLOVE, PROTEXIS PI CLASSIC, SZ-7.0, PF, LF

## (undated) DEVICE — IRRIGATION KIT, TURP, Y, LARGE BORE, 81 IN

## (undated) DEVICE — TUBING, SUCTION, NON-CONDUCTIVE,W/MALE CONNECT, 6MM X 12 FT, STERILE

## (undated) DEVICE — SOLUTION, IRRIGATION, USP, SODIUM CHLORIDE 0.9%, .9 NACL, 1000 ML, BAG

## (undated) DEVICE — FIBER, XPS LASER

## (undated) DEVICE — LUBRICANT, WATER SOLUBLE, BACTERIOSTATIC, 2 OZ, STERILE

## (undated) DEVICE — SOLUTION, IRRIGATION, USP, SODIUM CHLORIDE 0.9%, 3000 ML

## (undated) DEVICE — TOWEL PACK, STERILE, 4/PACK, BLUE